# Patient Record
Sex: MALE | Race: WHITE | NOT HISPANIC OR LATINO | URBAN - METROPOLITAN AREA
[De-identification: names, ages, dates, MRNs, and addresses within clinical notes are randomized per-mention and may not be internally consistent; named-entity substitution may affect disease eponyms.]

---

## 2022-01-28 LAB
LEFT EYE DIABETIC RETINOPATHY: NORMAL
RIGHT EYE DIABETIC RETINOPATHY: NORMAL

## 2023-02-03 ENCOUNTER — OFFICE VISIT (OUTPATIENT)
Dept: INTERNAL MEDICINE CLINIC | Facility: CLINIC | Age: 53
End: 2023-02-03

## 2023-02-03 VITALS
OXYGEN SATURATION: 97 % | TEMPERATURE: 97.7 F | SYSTOLIC BLOOD PRESSURE: 174 MMHG | DIASTOLIC BLOOD PRESSURE: 91 MMHG | WEIGHT: 289.8 LBS | RESPIRATION RATE: 18 BRPM | HEART RATE: 96 BPM

## 2023-02-03 DIAGNOSIS — R03.0 ELEVATED BP WITHOUT DIAGNOSIS OF HYPERTENSION: ICD-10-CM

## 2023-02-03 DIAGNOSIS — E11.42 TYPE 2 DIABETES MELLITUS WITH DIABETIC POLYNEUROPATHY, WITHOUT LONG-TERM CURRENT USE OF INSULIN (HCC): ICD-10-CM

## 2023-02-03 DIAGNOSIS — Z11.59 NEED FOR HEPATITIS C SCREENING TEST: ICD-10-CM

## 2023-02-03 DIAGNOSIS — K42.9 UMBILICAL HERNIA WITHOUT OBSTRUCTION AND WITHOUT GANGRENE: ICD-10-CM

## 2023-02-03 DIAGNOSIS — Z00.00 ANNUAL PHYSICAL EXAM: Primary | ICD-10-CM

## 2023-02-03 RX ORDER — BLOOD SUGAR DIAGNOSTIC
STRIP MISCELLANEOUS
Qty: 100 STRIP | Refills: 3 | Status: SHIPPED | OUTPATIENT
Start: 2023-02-03

## 2023-02-03 RX ORDER — LANCETS
EACH MISCELLANEOUS
Qty: 100 EACH | Refills: 3 | Status: SHIPPED | OUTPATIENT
Start: 2023-02-03

## 2023-02-03 RX ORDER — BLOOD-GLUCOSE METER
EACH MISCELLANEOUS DAILY
Qty: 1 KIT | Refills: 0 | Status: SHIPPED | OUTPATIENT
Start: 2023-02-03

## 2023-02-03 NOTE — PATIENT INSTRUCTIONS

## 2023-02-03 NOTE — PROGRESS NOTES
ADULT ANNUAL [address]     NAME: Deb Edwards  AGE: 46 y o  SEX: male  : 1970     DATE: 2/3/2023     Assessment and Plan:     Problem List Items Addressed This Visit    None  Visit Diagnoses     Annual physical exam    -  Primary    Relevant Orders    Lipid Panel with Direct LDL reflex (Completed)    Comprehensive metabolic panel (Completed)    CBC and Platelet (Completed)    Type 2 diabetes mellitus with diabetic polyneuropathy, without long-term current use of insulin (HCC)    - not currently on therapy  Will check a1c to determine if insulin will be needed  Relevant Medications    Lancets (onetouch ultrasoft) lancets    glucose blood (OneTouch Ultra) test strip    Blood Glucose Monitoring Suppl (ONE TOUCH ULTRA 2) w/Device KIT    Other Relevant Orders    HEMOGLOBIN A1C W/ EAG ESTIMATION (Completed)    Microalbumin / creatinine urine ratio (Completed)    Umbilical hernia without obstruction and without gangrene    - chronic  Small  Reducible and non tender  Pt will hold offon surgical consult for now  Need for hepatitis C screening test        Relevant Orders    Hepatitis C Antibody ([facility]) (Completed)    Elevated BP without diagnosis of hypertension  - no prior hx of antihypertenisv use  Will star work up with studies ordered  Pt to keep ambulatory Bps and will f/u in a week  Relevant Orders    Urinalysis with microscopic (Completed)          Immunizations and preventive care screenings were discussed with patient today  Appropriate education was printed on patient's after visit summary  Counseling:  Dental Health: discussed importance of regular tooth brushing, flossing, and dental visits  · Exercise: the importance of regular exercise/physical activity was discussed  Recommend exercise 3-5 times per week for at least 30 minutes            Return in about 1 week (around 2/10/2023) for Recheck  Chief Complaint:     Chief Complaint   Patient presents with   • Establish Care     Pt states that he is here to start care need a physical would like to get his diabetes under control and blood work done      History of Present Illness:     Adult Annual Physical   Patient here for a comprehensive physical exam    Reports hx of dm2  Not currenlty taking therapy  Was on isnuling and metformin years ago  Family hx includes DM2  Family hx of colon cancer of PGF  Works for [employer] and [employer]  · Diet/Nutrition: well balanced diet  · Exercise: no formal exercise  Depression Screening  PHQ-2/9 Depression Screening    Little interest or pleasure in doing things: 0 - not at all  Feeling down, depressed, or hopeless: 0 - not at all  PHQ-2 Score: 0  PHQ-2 Interpretation: Negative depression screen       General Health  · Sleep: gets 4-6 hours of sleep on average  · Hearing: normal - bilateral   · Vision: goes for regular eye exams and wears glasses  · Dental: no dental visits for >1 year   Health  · Symptoms include: none   · Never had colon cancer screening       Review of Systems:     Review of Systems   Respiratory: Negative for cough and shortness of breath  Cardiovascular: Negative for chest pain and leg swelling  Gastrointestinal: Negative for abdominal pain, constipation and diarrhea  Musculoskeletal: Positive for arthralgias (b/l knee )  Neurological: Positive for numbness (b/l feet )  Negative for headaches        Past Medical History:     Past Medical History:   Diagnosis Date   • [facility] ([facility] )       Past Surgical History:     Past Surgical History:   Procedure Laterality Date   • KNEE SURGERY Left 1995   • SURGERY SCROTAL / TESTICULAR Right 2011    maybe 10 years ago      Family History:     Family History   Problem Relation Age of Onset   • Diabetes Mother    • Diabetes Father       Social History:     Social History     Socioeconomic History   • Marital status: /Civil Union     Spouse name: None   • Number of children: None   • Years of education: None   • Highest education level: None   Occupational History   • None   Tobacco Use   • Smoking status: Never   • Smokeless tobacco: Never   Vaping Use   • Vaping Use: Never used   Substance and Sexual Activity   • Alcohol use: Not Currently     Alcohol/week: 6 0 standard drinks     Types: 6 Cans of beer per week     Comment: social maybe a 6 pack in one month   • Drug use: Never   • Sexual activity: None   Other Topics Concern   • None   Social History Narrative   • None     Social Determinants of Health     Financial Resource Strain: Not on file   Food Insecurity: Not on file   Transportation Needs: Not on file   Physical Activity: Not on file   Stress: Not on file   Social Connections: Not on file   Intimate Partner Violence: Not on file   Housing Stability: Not on file      Current Medications:     Current Outpatient Medications   Medication Sig Dispense Refill   • Blood Glucose Monitoring Suppl (ONE TOUCH ULTRA 2) w/Device KIT Use in the morning 1 kit 0   • glucose blood (OneTouch Ultra) test strip Use to check blood sugar every morning 100 strip 3   • Lancets (onetouch ultrasoft) lancets Use to check blood sugar every morning 100 each 3   • atorvastatin (LIPITOR) 10 mg tablet Take 1 tablet (10 mg total) by mouth daily 30 tablet 2   • metFORMIN (GLUCOPHAGE) 1000 MG tablet Take 1 tablet (1,000 mg total) by mouth daily with breakfast 30 tablet 0     No current facility-administered medications for this visit  Allergies:     No Known Allergies   Physical Exam:     BP (!) 174/91 (BP Location: Left arm, Patient Position: Sitting, Cuff Size: Large) Comment: micah and machine  Pulse 96   Temp 97 7 °F (36 5 °C) (Temporal)   Resp 18   Wt 131 kg (289 lb 12 8 oz)   SpO2 97%     Physical Exam  Vitals and nursing note reviewed  Constitutional:       General: He is not in acute distress  Appearance: He is well-developed  HENT:      Head: Normocephalic and atraumatic  Right Ear: External ear normal       Left Ear: External ear normal    Eyes:      Conjunctiva/sclera: Conjunctivae normal       Pupils: Pupils are equal, round, and reactive to light  Cardiovascular:      Rate and Rhythm: Normal rate and regular rhythm  Heart sounds: No murmur heard  Pulmonary:      Effort: Pulmonary effort is normal  No respiratory distress  Breath sounds: Normal breath sounds  Abdominal:      Palpations: Abdomen is soft  Tenderness: There is no abdominal tenderness  Hernia: A hernia (umblical; reducible; non tender ) is present  Musculoskeletal:         General: No swelling  Skin:     General: Skin is warm and dry  Capillary Refill: Capillary refill takes less than 2 seconds  Neurological:      Mental Status: He is alert and oriented to person, place, and time     Psychiatric:         Mood and Affect: Mood normal           Cuate Jorgensen DO  [facility]

## 2023-02-07 ENCOUNTER — APPOINTMENT (OUTPATIENT)
Dept: LAB | Facility: CLINIC | Age: 53
End: 2023-02-07

## 2023-02-07 DIAGNOSIS — Z00.00 ANNUAL PHYSICAL EXAM: ICD-10-CM

## 2023-02-07 DIAGNOSIS — E11.42 TYPE 2 DIABETES MELLITUS WITH DIABETIC POLYNEUROPATHY, WITHOUT LONG-TERM CURRENT USE OF INSULIN (HCC): ICD-10-CM

## 2023-02-07 DIAGNOSIS — Z11.59 NEED FOR HEPATITIS C SCREENING TEST: ICD-10-CM

## 2023-02-07 LAB
ALBUMIN SERPL BCP-MCNC: 3.4 G/DL (ref 3.5–5)
ALP SERPL-CCNC: 97 U/L (ref 46–116)
ALT SERPL W P-5'-P-CCNC: 24 U/L (ref 12–78)
ANION GAP SERPL CALCULATED.3IONS-SCNC: 7 MMOL/L (ref 4–13)
AST SERPL W P-5'-P-CCNC: 21 U/L (ref 5–45)
BACTERIA UR QL AUTO: ABNORMAL /HPF
BILIRUB SERPL-MCNC: 0.48 MG/DL (ref 0.2–1)
BILIRUB UR QL STRIP: NEGATIVE
BUN SERPL-MCNC: 16 MG/DL (ref 5–25)
CALCIUM ALBUM COR SERPL-MCNC: 9.8 MG/DL (ref 8.3–10.1)
CALCIUM SERPL-MCNC: 9.3 MG/DL (ref 8.3–10.1)
CHLORIDE SERPL-SCNC: 104 MMOL/L (ref 96–108)
CHOLEST SERPL-MCNC: 215 MG/DL
CLARITY UR: CLEAR
CO2 SERPL-SCNC: 24 MMOL/L (ref 21–32)
COLOR UR: ABNORMAL
CREAT SERPL-MCNC: 0.72 MG/DL (ref 0.6–1.3)
CREAT UR-MCNC: 73.8 MG/DL
ERYTHROCYTE [DISTWIDTH] IN BLOOD BY AUTOMATED COUNT: 13 % (ref 11.6–15.1)
GFR SERPL CREATININE-BSD FRML MDRD: 107 ML/MIN/1.73SQ M
GLUCOSE P FAST SERPL-MCNC: 287 MG/DL (ref 65–99)
GLUCOSE UR STRIP-MCNC: ABNORMAL MG/DL
HCT VFR BLD AUTO: 43.1 % (ref 36.5–49.3)
HDLC SERPL-MCNC: 57 MG/DL
HGB BLD-MCNC: 14.3 G/DL (ref 12–17)
HGB UR QL STRIP.AUTO: ABNORMAL
HYALINE CASTS #/AREA URNS LPF: ABNORMAL /LPF
KETONES UR STRIP-MCNC: NEGATIVE MG/DL
LDLC SERPL CALC-MCNC: 138 MG/DL (ref 0–100)
LEUKOCYTE ESTERASE UR QL STRIP: ABNORMAL
MCH RBC QN AUTO: 28.9 PG (ref 26.8–34.3)
MCHC RBC AUTO-ENTMCNC: 33.2 G/DL (ref 31.4–37.4)
MCV RBC AUTO: 87 FL (ref 82–98)
MICROALBUMIN UR-MCNC: 1890 MG/L (ref 0–20)
MICROALBUMIN/CREAT 24H UR: 2561 MG/G CREATININE (ref 0–30)
MUCOUS THREADS UR QL AUTO: ABNORMAL
NITRITE UR QL STRIP: NEGATIVE
NON-SQ EPI CELLS URNS QL MICRO: ABNORMAL /HPF
OTHER STN SPEC: ABNORMAL
PH UR STRIP.AUTO: 6 [PH]
PLATELET # BLD AUTO: 211 THOUSANDS/UL (ref 149–390)
PMV BLD AUTO: 12.7 FL (ref 8.9–12.7)
POTASSIUM SERPL-SCNC: 4.1 MMOL/L (ref 3.5–5.3)
PROT SERPL-MCNC: 7.3 G/DL (ref 6.4–8.4)
PROT UR STRIP-MCNC: ABNORMAL MG/DL
RBC # BLD AUTO: 4.95 MILLION/UL (ref 3.88–5.62)
RBC #/AREA URNS AUTO: ABNORMAL /HPF
SODIUM SERPL-SCNC: 135 MMOL/L (ref 135–147)
SP GR UR STRIP.AUTO: 1.03 (ref 1–1.03)
TRIGL SERPL-MCNC: 98 MG/DL
UROBILINOGEN UR STRIP-ACNC: <2 MG/DL
WBC # BLD AUTO: 7.63 THOUSAND/UL (ref 4.31–10.16)
WBC #/AREA URNS AUTO: ABNORMAL /HPF

## 2023-02-08 ENCOUNTER — TELEPHONE (OUTPATIENT)
Dept: INTERNAL MEDICINE CLINIC | Facility: CLINIC | Age: 53
End: 2023-02-08

## 2023-02-08 LAB
EST. AVERAGE GLUCOSE BLD GHB EST-MCNC: 237 MG/DL
HBA1C MFR BLD: 9.9 %
HCV AB SER QL: NORMAL

## 2023-02-08 NOTE — TELEPHONE ENCOUNTER
----- Message from Timbo Johnson DO sent at 2/8/2023  9:58 AM EST -----  Please schedule pt for office visit to discuss labs

## 2023-02-09 ENCOUNTER — TELEPHONE (OUTPATIENT)
Dept: INTERNAL MEDICINE CLINIC | Facility: CLINIC | Age: 53
End: 2023-02-09

## 2023-02-09 DIAGNOSIS — E78.2 MIXED HYPERLIPIDEMIA: Primary | ICD-10-CM

## 2023-02-09 DIAGNOSIS — E11.65 TYPE 2 DIABETES MELLITUS WITH HYPERGLYCEMIA, WITHOUT LONG-TERM CURRENT USE OF INSULIN (HCC): Primary | ICD-10-CM

## 2023-02-09 RX ORDER — ATORVASTATIN CALCIUM 10 MG/1
10 TABLET, FILM COATED ORAL DAILY
Qty: 30 TABLET | Refills: 2 | Status: SHIPPED | OUTPATIENT
Start: 2023-02-09 | End: 2023-03-11

## 2023-02-09 NOTE — TELEPHONE ENCOUNTER
----- Message from Kiet Jane DO sent at 2/9/2023  2:53 PM EST -----  A1c is high at 9 9  I have sent metformin 1000mg once a day to his pharmacy  He is to follow up in th office in 4 weeks and I will likely increase the dose and add a second medication at that time  His cholesterol is also high I would like him to start a cholesterol medication  I will send that to pharmacy as well

## 2023-02-15 ENCOUNTER — OFFICE VISIT (OUTPATIENT)
Dept: INTERNAL MEDICINE CLINIC | Facility: CLINIC | Age: 53
End: 2023-02-15

## 2023-02-15 VITALS
WEIGHT: 258.2 LBS | RESPIRATION RATE: 18 BRPM | TEMPERATURE: 97.5 F | SYSTOLIC BLOOD PRESSURE: 183 MMHG | DIASTOLIC BLOOD PRESSURE: 92 MMHG | OXYGEN SATURATION: 98 % | HEART RATE: 87 BPM

## 2023-02-15 DIAGNOSIS — I10 PRIMARY HYPERTENSION: ICD-10-CM

## 2023-02-15 DIAGNOSIS — E11.42 TYPE 2 DIABETES MELLITUS WITH DIABETIC POLYNEUROPATHY, WITHOUT LONG-TERM CURRENT USE OF INSULIN (HCC): Primary | ICD-10-CM

## 2023-02-15 PROBLEM — K42.9 UMBILICAL HERNIA WITHOUT OBSTRUCTION AND WITHOUT GANGRENE: Status: ACTIVE | Noted: 2023-02-15

## 2023-02-15 RX ORDER — DULAGLUTIDE 0.75 MG/.5ML
0.75 INJECTION, SOLUTION SUBCUTANEOUS
Qty: 2 ML | Refills: 0 | Status: SHIPPED | OUTPATIENT
Start: 2023-02-15 | End: 2023-08-14

## 2023-02-15 RX ORDER — LISINOPRIL 10 MG/1
10 TABLET ORAL DAILY
Qty: 30 TABLET | Refills: 0 | Status: SHIPPED | OUTPATIENT
Start: 2023-02-15 | End: 2023-03-17

## 2023-02-15 NOTE — PROGRESS NOTES
Name: Rossi Pimentel      : 1970      MRN: [id number]  Encounter Provider: Kj Torres DO  Encounter Date: 2/15/2023   [facility]   [address]     1  Type 2 diabetes mellitus with diabetic polyneuropathy, without long-term current use of insulin (HCC)-a m  blood sugars are improving  Now on 200s on metformin 1000mg twice daily  Will add GLP-1 agonist   Patient to continue checking a m  glucose log  Foot exam completed  Patient  follows with podiatry  -     dulaglutide (Trulicity) 1 84 RT/8 1UW injection; Inject 0 5 mL (0 75 mg total) under the skin every 7 days  -     lisinopril (ZESTRIL) 10 mg tablet; Take 1 tablet (10 mg total) by mouth daily    2  Primary hypertension-persistent elevation  No secondary causes noted on laboratory studies will start ACE therapy  Patient will return in 1 week for BP check  Will evaluate response to his therapy with metabolic panel   -     lisinopril (ZESTRIL) 10 mg tablet; Take 1 tablet (10 mg total) by mouth daily  -     Basic metabolic panel; Future           Subjective      Discussed metformin use  Tolerating it well  AM blood sugars in the 200s  Discussed blood pressures  Review of Systems   Eyes: Negative for visual disturbance  Respiratory: Negative for shortness of breath  Cardiovascular: Negative for chest pain and leg swelling  Gastrointestinal: Negative for abdominal pain, diarrhea and nausea  Neurological: Negative for headaches         Current Outpatient Medications on File Prior to Visit   Medication Sig   • atorvastatin (LIPITOR) 10 mg tablet Take 1 tablet (10 mg total) by mouth daily   • Blood Glucose Monitoring Suppl (ONE TOUCH ULTRA 2) w/Device KIT Use in the morning   • glucose blood (OneTouch Ultra) test strip Use to check blood sugar every morning   • Lancets (onetouch ultrasoft) lancets Use to check blood sugar every morning   • metFORMIN (GLUCOPHAGE) 1000 MG tablet Take 1 tablet (1,000 mg total) by mouth daily with breakfast       Objective     BP (!) 183/92 (BP Location: Left arm, Patient Position: Standing, Cuff Size: Standard) Comment: ollie and machine  Pulse 87   Temp 97 5 °F (36 4 °C) (Temporal)   Resp 18   Wt 117 kg (258 lb 3 2 oz)   SpO2 98%     Physical Exam  HENT:      Head: Normocephalic and atraumatic  Eyes:      Conjunctiva/sclera: Conjunctivae normal    Cardiovascular:      Rate and Rhythm: Normal rate and regular rhythm  Pulses: no weak pulses          Posterior tibial pulses are 2+ on the right side and 2+ on the left side  Pulmonary:      Effort: Pulmonary effort is normal       Breath sounds: Normal breath sounds  Musculoskeletal:      Right lower leg: No edema  Left lower leg: No edema  Feet:      Right foot:      Skin integrity: Callus and dry skin present  No erythema  Left foot:      Skin integrity: Callus and dry skin present  No erythema  Neurological:      Mental Status: He is alert and oriented to person, place, and time  Patient's shoes and socks removed  Right Foot/Ankle   Right Foot Inspection  Skin Exam: skin intact, dry skin, callus and callus  No erythema  Toe Exam: No tenderness    Sensory   Proprioception: intact  Monofilament testing: intact    Vascular  Capillary refills: < 3 seconds  The right PT pulse is 2+  Left Foot/Ankle  Left Foot Inspection  Skin Exam: skin intact, dry skin and callus  No erythema  Toe Exam: No tenderness and no left toe deformity  Sensory   Proprioception: intact  Monofilament testing: intact    Vascular  Capillary refills: < 3 seconds  The left PT pulse is 2+       Assign Risk Category  No deformity present  No loss of protective sensation  No weak pulses  Risk: [address], DO

## 2023-02-15 NOTE — PATIENT INSTRUCTIONS
Start the new blood pressure medication call lisinopril     Start the new blood sugar medication called trulicity once a week   Continue the metformin

## 2023-02-16 ENCOUNTER — TELEPHONE (OUTPATIENT)
Dept: ADMINISTRATIVE | Facility: OTHER | Age: 53
End: 2023-02-16

## 2023-02-16 NOTE — TELEPHONE ENCOUNTER
----- [facility] sent at 2/15/2023 10:50 AM EST -----  02/15/23 10:50 AM    Hello, our patient Huntley Goodpasture has had eye exam [place] [facility] at [facility] completed/performed   Please assist in updating the patient chart by [[email or web address] The date of service maybe 6 months ago 2022    Thank you,  [facility] [address]

## 2023-02-16 NOTE — LETTER
Diabetic Eye Exam Form    Date Requested: 23  Patient: Oliver Judd  Patient : 1970   Referring Provider: Jenny Cochran DO      DIABETIC Eye Exam Date _______________________________      Type of Exam MUST be documented for Diabetic Eye Exams  Please CHECK ONE  Retinal Exam       Dilated Retinal Exam       OCT       Optomap-Iris Exam      Fundus Photography       Left Eye - Please check Retinopathy or No Retinopathy        Exam did show retinopathy    Exam did not show retinopathy       Right Eye - Please check Retinopathy or No Retinopathy       Exam did show retinopathy    Exam did not show retinopathy       Comments __________________________________________________________    Practice Providing Exam ______________________________________________    Exam Performed By (print name) _______________________________________      Provider Signature ___________________________________________________      These reports are needed for  compliance  Please fax this completed form and a copy of the Diabetic Eye Exam report to our office located at [place] [address] as soon as possible via Fax [phone] beth Jerrye: Phone [phone]  We thank you for your assistance in treating our mutual patient

## 2023-02-20 NOTE — TELEPHONE ENCOUNTER
Upon review of the In Basket request and the patient's chart, initial outreach has been made via fax to facility  Please see Contacts section for details       Thank you  Marylene Pain, MA

## 2023-02-22 NOTE — TELEPHONE ENCOUNTER
Upon review of the In Basket request we were able to locate, review, and update the patient chart as requested for Diabetic Eye Exam     Any additional questions or concerns should be emailed to the Practice Liaisons via the appropriate education email address, please do not reply via In Basket      Thank you  Cole Mc MA

## 2023-03-05 DIAGNOSIS — E11.65 TYPE 2 DIABETES MELLITUS WITH HYPERGLYCEMIA, WITHOUT LONG-TERM CURRENT USE OF INSULIN (HCC): ICD-10-CM

## 2023-03-05 DIAGNOSIS — E78.2 MIXED HYPERLIPIDEMIA: ICD-10-CM

## 2023-03-05 RX ORDER — ATORVASTATIN CALCIUM 10 MG/1
TABLET, FILM COATED ORAL
Qty: 90 TABLET | Refills: 1 | Status: SHIPPED | OUTPATIENT
Start: 2023-03-05

## 2023-03-10 DIAGNOSIS — I10 PRIMARY HYPERTENSION: ICD-10-CM

## 2023-03-10 DIAGNOSIS — E11.42 TYPE 2 DIABETES MELLITUS WITH DIABETIC POLYNEUROPATHY, WITHOUT LONG-TERM CURRENT USE OF INSULIN (HCC): ICD-10-CM

## 2023-03-10 RX ORDER — LISINOPRIL 10 MG/1
TABLET ORAL
Qty: 90 TABLET | Refills: 1 | Status: SHIPPED | OUTPATIENT
Start: 2023-03-10 | End: 2023-03-15 | Stop reason: SDUPTHER

## 2023-03-13 ENCOUNTER — APPOINTMENT (OUTPATIENT)
Dept: LAB | Facility: CLINIC | Age: 53
End: 2023-03-13

## 2023-03-13 DIAGNOSIS — I10 PRIMARY HYPERTENSION: ICD-10-CM

## 2023-03-13 LAB
ANION GAP SERPL CALCULATED.3IONS-SCNC: 4 MMOL/L (ref 4–13)
BUN SERPL-MCNC: 19 MG/DL (ref 5–25)
CALCIUM SERPL-MCNC: 9.6 MG/DL (ref 8.3–10.1)
CHLORIDE SERPL-SCNC: 108 MMOL/L (ref 96–108)
CO2 SERPL-SCNC: 25 MMOL/L (ref 21–32)
CREAT SERPL-MCNC: 0.81 MG/DL (ref 0.6–1.3)
GFR SERPL CREATININE-BSD FRML MDRD: 102 ML/MIN/1.73SQ M
GLUCOSE P FAST SERPL-MCNC: 135 MG/DL (ref 65–99)
POTASSIUM SERPL-SCNC: 4.2 MMOL/L (ref 3.5–5.3)
SODIUM SERPL-SCNC: 137 MMOL/L (ref 135–147)

## 2023-03-15 ENCOUNTER — OFFICE VISIT (OUTPATIENT)
Dept: INTERNAL MEDICINE CLINIC | Facility: CLINIC | Age: 53
End: 2023-03-15

## 2023-03-15 VITALS
DIASTOLIC BLOOD PRESSURE: 90 MMHG | OXYGEN SATURATION: 98 % | WEIGHT: 280.2 LBS | TEMPERATURE: 97.5 F | SYSTOLIC BLOOD PRESSURE: 158 MMHG | HEART RATE: 82 BPM | RESPIRATION RATE: 18 BRPM

## 2023-03-15 DIAGNOSIS — E11.42 TYPE 2 DIABETES MELLITUS WITH DIABETIC POLYNEUROPATHY, WITHOUT LONG-TERM CURRENT USE OF INSULIN (HCC): ICD-10-CM

## 2023-03-15 DIAGNOSIS — M25.562 CHRONIC PAIN OF LEFT KNEE: ICD-10-CM

## 2023-03-15 DIAGNOSIS — G89.29 CHRONIC PAIN OF LEFT KNEE: ICD-10-CM

## 2023-03-15 DIAGNOSIS — I10 PRIMARY HYPERTENSION: Primary | ICD-10-CM

## 2023-03-15 RX ORDER — LISINOPRIL 20 MG/1
20 TABLET ORAL DAILY
Qty: 90 TABLET | Refills: 0 | Status: SHIPPED | OUTPATIENT
Start: 2023-03-15 | End: 2023-06-13

## 2023-03-15 NOTE — PROGRESS NOTES
Name: Tomas Perea      : 1970      MRN: [id number]  Encounter Provider: Lamin Pugh DO  Encounter Date: 3/15/2023   Enc[facility]: [facility]   [address]     1  Primary hypertension-improved with lisinopril 10mg qd  Not quite at goal  Will increase to 20mg qd  -     lisinopril (ZESTRIL) 20 mg tablet; Take 1 tablet (20 mg total) by mouth daily    2  Type 2 diabetes mellitus with diabetic polyneuropathy, without long-term current use of insulin ([address] )- BG improving  Will increase glp-1 agonist  Continue metformin 1000mg bid  If nausea persist with weekly injection will consider converting to oral GLP1    -     lisinopril (ZESTRIL) 20 mg tablet; Take 1 tablet (20 mg total) by mouth daily  -     dulaglutide (Trulicity) 1 5 SL/5 1WL injection; Inject 0 5 mL (1 5 mg total) under the skin every 7 days  -     HEMOGLOBIN A1C W/ EAG ESTIMATION; Future    3  Chronic pain of left knee- known OA  C/o stiffness and weakness especially after prolonged sitting  Follows with orthopedics  Will have pt start PT and f/u with orthopedics if no improvement  -     Ambulatory Referral to Physical Therapy; Future           Subjective        Last 5 BP BP Readings from Last 5 Encounters:  03/15/23 : 158/90  02/15/23 : (!) 183/92  23 : (!) 174/91    Current Meds Lisinopril 10mg qd  Checking BG  AM fasting 130s-120s  Taking truclity weekly  On the day of the injection has some nausea and abdominal discomfort for the day then it resovles by the next day  Review of Systems   Constitutional: Negative for fever  Respiratory: Negative for shortness of breath  Cardiovascular: Negative for chest pain and leg swelling  Gastrointestinal: Positive for nausea  Negative for vomiting  Musculoskeletal: Positive for arthralgias         Current Outpatient Medications on File Prior to Visit   Medication Sig   • atorvastatin (LIPITOR) 10 mg tablet TAKE 1 TABLET BY MOUTH EVERY DAY   • Blood Glucose Monitoring Suppl (ONE TOUCH ULTRA 2) w/Device KIT Use in the morning   • glucose blood (OneTouch Ultra) test strip Use to check blood sugar every morning   • Lancets (onetouch ultrasoft) lancets Use to check blood sugar every morning   • metFORMIN (GLUCOPHAGE) 1000 MG tablet TAKE 1 TABLET BY MOUTH EVERY DAY WITH BREAKFAST   • [DISCONTINUED] dulaglutide (Trulicity) 0 10 QL/7 6WT injection Inject 0 5 mL (0 75 mg total) under the skin every 7 days   • [DISCONTINUED] lisinopril (ZESTRIL) 10 mg tablet TAKE 1 TABLET BY MOUTH EVERY DAY       Objective     /90 (BP Location: Left arm, Patient Position: Sitting, Cuff Size: Standard)   Pulse 82   Temp 97 5 °F (36 4 °C) (Temporal)   Resp 18   Wt 127 kg (280 lb 3 2 oz)   SpO2 98%     Physical Exam  HENT:      Head: Normocephalic and atraumatic  Eyes:      Conjunctiva/sclera: Conjunctivae normal    Cardiovascular:      Rate and Rhythm: Normal rate and regular rhythm  Heart sounds: No murmur heard  Pulmonary:      Effort: Pulmonary effort is normal       Breath sounds: Normal breath sounds  Abdominal:      General: Bowel sounds are normal       Palpations: Abdomen is soft  Tenderness: There is no abdominal tenderness  Hernia: A hernia is present  Musculoskeletal:      Right lower leg: No edema  Left lower leg: No edema  Neurological:      Mental Status: He is oriented to person, place, and time        Gait: Gait normal    Psychiatric:         Mood and Affect: Mood normal          Behavior: Behavior normal        Josephine Daily DO

## 2023-03-17 ENCOUNTER — TELEPHONE (OUTPATIENT)
Dept: INTERNAL MEDICINE CLINIC | Facility: CLINIC | Age: 53
End: 2023-03-17

## 2023-03-17 NOTE — TELEPHONE ENCOUNTER
----- Message from Sendy Contreras DO sent at 3/17/2023  1:13 PM EDT -----  BMP is ok   Blood sugars have improved some

## 2023-05-10 ENCOUNTER — OFFICE VISIT (OUTPATIENT)
Age: 53
End: 2023-05-10

## 2023-05-10 ENCOUNTER — APPOINTMENT (OUTPATIENT)
Age: 53
End: 2023-05-10

## 2023-05-10 VITALS
RESPIRATION RATE: 18 BRPM | HEART RATE: 91 BPM | DIASTOLIC BLOOD PRESSURE: 102 MMHG | SYSTOLIC BLOOD PRESSURE: 148 MMHG | WEIGHT: 277.6 LBS | OXYGEN SATURATION: 98 % | TEMPERATURE: 97.7 F

## 2023-05-10 DIAGNOSIS — Z12.11 COLON CANCER SCREENING: ICD-10-CM

## 2023-05-10 DIAGNOSIS — E11.42 TYPE 2 DIABETES MELLITUS WITH DIABETIC POLYNEUROPATHY, WITHOUT LONG-TERM CURRENT USE OF INSULIN (HCC): ICD-10-CM

## 2023-05-10 DIAGNOSIS — I10 PRIMARY HYPERTENSION: ICD-10-CM

## 2023-05-10 DIAGNOSIS — E11.42 TYPE 2 DIABETES MELLITUS WITH DIABETIC POLYNEUROPATHY, WITHOUT LONG-TERM CURRENT USE OF INSULIN (HCC): Primary | ICD-10-CM

## 2023-05-10 LAB
EST. AVERAGE GLUCOSE BLD GHB EST-MCNC: 143 MG/DL
HBA1C MFR BLD: 6.6 %
LEFT EYE DIABETIC RETINOPATHY: ABNORMAL
LEFT EYE IMAGE QUALITY: ABNORMAL
LEFT EYE MACULAR EDEMA: ABNORMAL
LEFT EYE OTHER RETINOPATHY: ABNORMAL
RIGHT EYE DIABETIC RETINOPATHY: ABNORMAL
RIGHT EYE IMAGE QUALITY: ABNORMAL
RIGHT EYE MACULAR EDEMA: ABNORMAL
RIGHT EYE OTHER RETINOPATHY: ABNORMAL
SEVERITY (EYE EXAM): ABNORMAL

## 2023-05-10 RX ORDER — LISINOPRIL 40 MG/1
40 TABLET ORAL DAILY
Qty: 90 TABLET | Refills: 0 | Status: SHIPPED | OUTPATIENT
Start: 2023-05-10 | End: 2023-08-08

## 2023-05-10 NOTE — PROGRESS NOTES
Name: Holland Emmanuel      : 1970      MRN: [id number]  Encounter Provider: Gaby Basurto DO  Encounter Date: 5/10/2023   Encounter department: [address] [place] [address]  Type 2 diabetes mellitus with diabetic polyneuropathy, without long-term current use of insulin ([facility] )- blood sugars improving on trulicity 5 3HT weekly and metformin 2000mg qd  Pt to continue  Neuropathy of b/l feet has improved but has not remitted  Discussed potential gabapentin use  Pt would like to wait for now and see if with continue blood sugar improvement if the paraesthesias remit  a1c pending    -     IRIS Diabetic eye exam  2  Primary hypertension- not at goal  Will increase ace to 40mg qd  Pt to f/u next week for BP check  If no improvement will consider HCTZ vs CBB  -     lisinopril (ZESTRIL) 40 mg tablet; Take 1 tablet (40 mg total) by mouth daily    3  Colon cancer screening  -     Ambulatory referral for colonoscopy; Future           Subjective      Presents for htn and dm2 f/u  Last visit ace increased and truclicity was increased to 1 5 qweekly  Pt reports BG in 150s or less  Before bed he can see number in lower 100s  BP at home 140s   Hypertension / Hyperlipidemia       Last 5 BP BP Readings from Last 5 Encounters:  05/10/23 : (!) 148/102  03/15/23 : 158/90  02/15/23 : (!) 183/92  23 : (!) 174/91    Current Meds lisinopril 20mg qd  Review of Systems   Constitutional: Negative for fever  Respiratory: Negative for shortness of breath  Cardiovascular: Negative for chest pain  Gastrointestinal: Positive for nausea (associated with GLP agonist )  Neurological: Negative for headaches         Current Outpatient Medications on File Prior to Visit   Medication Sig   • atorvastatin (LIPITOR) 10 mg tablet TAKE 1 TABLET BY MOUTH EVERY DAY   • Blood Glucose Monitoring Suppl (ONE TOUCH ULTRA 2) w/Device KIT Use in the morning   • dulaglutide (Trulicity) 1 5 QW/0 5VQ injection Inject 0 5 mL (1 5 mg total) under the skin every 7 days   • glucose blood (OneTouch Ultra) test strip Use to check blood sugar every morning   • Lancets (onetouch ultrasoft) lancets Use to check blood sugar every morning   • metFORMIN (GLUCOPHAGE) 1000 MG tablet TAKE 1 TABLET BY MOUTH EVERY DAY WITH BREAKFAST   • [DISCONTINUED] lisinopril (ZESTRIL) 20 mg tablet Take 1 tablet (20 mg total) by mouth daily       Objective     BP (!) 148/102 (BP Location: Left arm, Patient Position: Sitting, Cuff Size: Standard)   Pulse 91   Temp 97 7 °F (36 5 °C) (Temporal)   Resp 18   Wt 126 kg (277 lb 9 6 oz)   SpO2 98%     Physical Exam  Cardiovascular:      Rate and Rhythm: Normal rate and regular rhythm  Pulmonary:      Effort: Pulmonary effort is normal       Breath sounds: Normal breath sounds  Abdominal:      General: Bowel sounds are increased  Tenderness: There is no abdominal tenderness  Hernia: A hernia is present  Neurological:      Mental Status: He is alert         Ely Jung, DO

## 2023-05-11 ENCOUNTER — TELEPHONE (OUTPATIENT)
Age: 53
End: 2023-05-11

## 2023-05-11 NOTE — TELEPHONE ENCOUNTER
----- Message from Betty Yung DO sent at 5/11/2023  8:47 AM EDT -----  Iris screen shows known diabetic related changes to the vessels in the eye continue to follow up with eye doctor

## 2023-05-11 NOTE — TELEPHONE ENCOUNTER
----- Message from Margi Guzman DO sent at 5/11/2023  8:46 AM EDT -----  A1c is 6 6  this is great  In the range it needs to be

## 2023-06-22 ENCOUNTER — OFFICE VISIT (OUTPATIENT)
Dept: GASTROENTEROLOGY | Facility: CLINIC | Age: 53
End: 2023-06-22
Payer: COMMERCIAL

## 2023-06-22 VITALS
BODY MASS INDEX: 39.65 KG/M2 | SYSTOLIC BLOOD PRESSURE: 162 MMHG | WEIGHT: 277 LBS | DIASTOLIC BLOOD PRESSURE: 100 MMHG | HEART RATE: 88 BPM | HEIGHT: 70 IN | OXYGEN SATURATION: 98 %

## 2023-06-22 DIAGNOSIS — K62.5 RECTAL BLEEDING: ICD-10-CM

## 2023-06-22 DIAGNOSIS — R19.4 CHANGE IN BOWEL HABITS: Primary | ICD-10-CM

## 2023-06-22 DIAGNOSIS — Z80.0 FAMILY HISTORY OF COLON CANCER: ICD-10-CM

## 2023-06-22 DIAGNOSIS — R15.2 FECAL URGENCY: ICD-10-CM

## 2023-06-22 DIAGNOSIS — K59.09 OTHER CONSTIPATION: ICD-10-CM

## 2023-06-22 DIAGNOSIS — Z12.11 COLON CANCER SCREENING: ICD-10-CM

## 2023-06-22 PROCEDURE — 99204 OFFICE O/P NEW MOD 45 MIN: CPT | Performed by: INTERNAL MEDICINE

## 2023-06-22 NOTE — PROGRESS NOTES
[facility]    Dear Dr Meghann Son,    I had the pleasure of seeing your patient Glo Pederson in the office today and I thank you for this kind referral        Chief Complaint: Change in bowel habits      HPI:  Glo Pederson is a 46 y o  male who presents with long history of diabetic gastroparesis which greatly improved as his glucose came under better control  He stopped going to the doctor regularly and his blood sugar got very out-of-control with hemoglobin A1c levels over 10 according to the patient  Since getting back on the program he has lost approximately 90 pounds  His hemoglobin A1c is back in the 6 range  The patient is no longer having any gastroparesis symptoms  However he had a distinct change in bowel habits during all of this  He was having some loose stool in the beginning but then constipation and a lot of abdominal gas  He occasionally has to push because of a sense of urgency  Occasionally if passing gas he will also pass stool  He does not have full incontinence of feces  He sees occasional bright red blood on the toilet paper  The patient has no other lower GI symptomatology  He has never had a colonoscopy  There is a family history of colon cancer in the paternal grandfather and a paternal aunt  He has no other GI symptoms at the present time  Review of Systems:   Constitutional: No fever or chills, feels well, no tiredness, weight loss as above  HENT: No complaints of earache, no hearing loss, no nosebleeds, no nasal discharge, no sore throat, no hoarseness  Eyes: No complaints of eye pain, no red eyes, no discharge from eyes, no itchy eyes  Cardiovascular: No complaints of slow heart rate, no fast heart rate, no chest pain, no palpitations, no leg claudication, no lower extremity edema  Respiratory: No complaints of shortness of breath, no wheezing, no cough, no SOB on exertion, no orthopnea     Gastrointestinal: As noted in "HPI  Genitourinary: No complaints of dysuria, no incontinence, no hesitancy, no nocturia  Musculoskeletal: Positive bilateral knee arthralgia, no myalgias, no joint swelling or stiffness, no limb pain or swelling  Neurological: No complaints of headache, no confusion, no convulsions, no numbness or tingling, no dizziness or fainting, no limb weakness, no difficulty walking  Skin: No complaints of skin rash or skin lesions, no itching, no skin wound, no dry skin  Hematological/Lymphatic: No complaints of swollen glands, does not bleed easy  Allergic/Immunologic: No immunocompromised state  Endocrine:  No complaints of polyuria, no polydipsia  Psychiatric/Behavioral: is not suicidal, no sleep disturbances, no anxiety or depression, no change in personality, no emotional problems  Historical Information   Past Medical History:   Diagnosis Date   • Diabetes mellitus ([facility] [address] )    • Hyperlipidemia    • Hypertension      Past Surgical History:   Procedure Laterality Date   • KNEE SURGERY Left 1995   • SURGERY SCROTAL / TESTICULAR Right 2011    maybe 10 years ago     Social History   Social History     Substance and Sexual Activity   Alcohol Use Not Currently   • Alcohol/week: 6 0 standard drinks of alcohol   • Types: 6 Cans of beer per week    Comment: social maybe a 6 pack in one month     Social History     Substance and Sexual Activity   Drug Use Never     Social History     Tobacco Use   Smoking Status Never   Smokeless Tobacco Never     Family History   Problem Relation Age of Onset   • Diabetes Mother    • Diabetes Father          Current Medications: has a current medication list which includes the following prescription(s): atorvastatin, one touch ultra 2, dulaglutide, onetouch ultra, onetouch ultrasoft, lisinopril, and metformin         Vital Signs: /100   Pulse 88   Ht 5' 10\" (1 778 m)   Wt 126 kg (277 lb)   SpO2 98%   BMI 39 75 kg/m²     Physical Exam:   Constitutional  General " Appearance: No acute distress, well appearing and well nourished  Head  Normocephalic  Eyes  Conjunctivae and lids: No swelling, erythema, or discharge  Pupils and irises: Equal, round and reactive to light  Ears, Nose, Mouth, and Throat  External inspection of ears and nose: Normal  Nasal mucosa, septum and turbinates: Normal without edema or erythema/   Oropharynx: Normal with no erythema, edema, exudate or lesions  Neck  Normal range of motion  Neck supple  Cardiovascular  Auscultation of the heart: Normal rate and rhythm, normal S1 and S2 without murmurs  Examination of the extremities for edema and/or varicosities: Normal  Pulmonary/Chest  Respiratory effort: No increased work of breathing or signs of respiratory distress  Auscultation of lungs: Clear to auscultation, equal breath sounds bilaterally, no wheezes, rales, no rhonchi  Abdomen  Abdomen: Non-tender, no masses  Positive ventral hernia, nontender and reducible  Liver and spleen: No hepatomegaly or splenomegaly  Musculoskeletal  Gait and station: normal   Digits and Nails: normal without clubbing or cyanosis  Inspection/palpation of joints, bones, and muscles: Normal  Neurological  No nystagmus or asterixis  Skin  Skin and subcutaneous tissue: Normal without rashes or lesions  Lymphatic  Palpation of the lymph nodes in neck: No lymphadenopathy     Psychiatric  Orientation to person, place and time: Normal   Mood and affect: Normal          Labs:   Lab Results   Component Value Date    ALT 24 02/07/2023    AST 21 02/07/2023    BUN 19 03/13/2023    CALCIUM 9 6 03/13/2023     03/13/2023    CO2 25 03/13/2023    CREATININE 0 81 03/13/2023    HDL 57 02/07/2023    HCT 43 1 02/07/2023    HGB 14 3 02/07/2023    HGBA1C 6 6 (H) 05/10/2023     02/07/2023    K 4 2 03/13/2023    TRIG 98 02/07/2023    WBC 7 63 02/07/2023         X-Rays & Procedures:   No orders to display ______________________________________________________________________      Assessment & Plan:      Diagnoses and all orders for this visit:    Change in bowel habits  -     Colonoscopy; Future    Colon cancer screening  -     Ambulatory referral for colonoscopy    Other constipation  -     Colonoscopy; Future    Fecal urgency  -     Colonoscopy; Future    Rectal bleeding  -     Colonoscopy; Future    Family history of colon cancer  -     Colonoscopy; Future        I have taken the liberty of scheduling the patient for colonoscopy  I will be happy to inform you of his results and further recommendations  I would like to thank you for allowing me to participate in his care              With warmest regards,    Luis Logan MD, [facility]

## 2023-06-22 NOTE — LETTER
June 22, 2023     Gurvinder Medina DO  [address] [facility] [address]    Patient: Nadine Rodriguez   YOB: 1970   Date of Visit: 6/22/2023       Dear Dr Denis Bello: Thank you for referring Nadine Rodriguez to me for evaluation  Below are my notes for this consultation  If you have questions, please do not hesitate to call me  I look forward to following your patient along with you  Sincerely,        Hal Branham MD        CC: No Recipients    Hal Branham MD  6/22/2023  3:46 PM  [facility]    Dear Dr Denis Bello,    I had the pleasure of seeing your patient Nadine Rodriguez in the office today and I thank you for this kind referral        Chief Complaint: Change in bowel habits      HPI:  Nadine Rodriguez is a 46 y o  male who presents with long history of diabetic gastroparesis which greatly improved as his glucose came under better control  He stopped going to the doctor regularly and his blood sugar got very out-of-control with hemoglobin A1c levels over 10 according to the patient  Since getting back on the program he has lost approximately 90 pounds  His hemoglobin A1c is back in the 6 range  The patient is no longer having any gastroparesis symptoms  However he had a distinct change in bowel habits during all of this  He was having some loose stool in the beginning but then constipation and a lot of abdominal gas  He occasionally has to push because of a sense of urgency  Occasionally if passing gas he will also pass stool  He does not have full incontinence of feces  He sees occasional bright red blood on the toilet paper  The patient has no other lower GI symptomatology  He has never had a colonoscopy  There is a family history of colon cancer in the paternal grandfather and a paternal aunt  He has no other GI symptoms at the present time        Review of Systems:   Constitutional: No fever or chills, feels well, no tiredness, weight loss as above  HENT: No complaints of earache, no hearing loss, no nosebleeds, no nasal discharge, no sore throat, no hoarseness  Eyes: No complaints of eye pain, no red eyes, no discharge from eyes, no itchy eyes  Cardiovascular: No complaints of slow heart rate, no fast heart rate, no chest pain, no palpitations, no leg claudication, no lower extremity edema  Respiratory: No complaints of shortness of breath, no wheezing, no cough, no SOB on exertion, no orthopnea  Gastrointestinal: As noted in HPI  Genitourinary: No complaints of dysuria, no incontinence, no hesitancy, no nocturia  Musculoskeletal: Positive bilateral knee arthralgia, no myalgias, no joint swelling or stiffness, no limb pain or swelling  Neurological: No complaints of headache, no confusion, no convulsions, no numbness or tingling, no dizziness or fainting, no limb weakness, no difficulty walking  Skin: No complaints of skin rash or skin lesions, no itching, no skin wound, no dry skin  Hematological/Lymphatic: No complaints of swollen glands, does not bleed easy  Allergic/Immunologic: No immunocompromised state  Endocrine:  No complaints of polyuria, no polydipsia  Psychiatric/Behavioral: is not suicidal, no sleep disturbances, no anxiety or depression, no change in personality, no emotional problems         Historical Information   Past Medical History:   Diagnosis Date   • Diabetes mellitus ([facility] [address] )    • Hyperlipidemia    • Hypertension      Past Surgical History:   Procedure Laterality Date   • KNEE SURGERY Left 1995   • SURGERY SCROTAL / TESTICULAR Right 2011    maybe 10 years ago     Social History   Social History     Substance and Sexual Activity   Alcohol Use Not Currently   • Alcohol/week: 6 0 standard drinks of alcohol   • Types: 6 Cans of beer per week    Comment: social maybe a 6 pack in one month     Social History     Substance and Sexual Activity   Drug Use Never "    Social History     Tobacco Use   Smoking Status Never   Smokeless Tobacco Never     Family History   Problem Relation Age of Onset   • Diabetes Mother    • Diabetes Father          Current Medications: has a current medication list which includes the following prescription(s): atorvastatin, one touch ultra 2, dulaglutide, onetouch ultra, onetouch ultrasoft, lisinopril, and metformin  Vital Signs: /100   Pulse 88   Ht 5' 10\" (1 778 m)   Wt 126 kg (277 lb)   SpO2 98%   BMI 39 75 kg/m²     Physical Exam:   Constitutional  General Appearance: No acute distress, well appearing and well nourished  Head  Normocephalic  Eyes  Conjunctivae and lids: No swelling, erythema, or discharge  Pupils and irises: Equal, round and reactive to light  Ears, Nose, Mouth, and Throat  External inspection of ears and nose: Normal  Nasal mucosa, septum and turbinates: Normal without edema or erythema/   Oropharynx: Normal with no erythema, edema, exudate or lesions  Neck  Normal range of motion  Neck supple  Cardiovascular  Auscultation of the heart: Normal rate and rhythm, normal S1 and S2 without murmurs  Examination of the extremities for edema and/or varicosities: Normal  Pulmonary/Chest  Respiratory effort: No increased work of breathing or signs of respiratory distress  Auscultation of lungs: Clear to auscultation, equal breath sounds bilaterally, no wheezes, rales, no rhonchi  Abdomen  Abdomen: Non-tender, no masses  Positive ventral hernia, nontender and reducible  Liver and spleen: No hepatomegaly or splenomegaly  Musculoskeletal  Gait and station: normal   Digits and Nails: normal without clubbing or cyanosis  Inspection/palpation of joints, bones, and muscles: Normal  Neurological  No nystagmus or asterixis  Skin  Skin and subcutaneous tissue: Normal without rashes or lesions  Lymphatic  Palpation of the lymph nodes in neck: No lymphadenopathy     Psychiatric  Orientation to person, place " and time: Normal   Mood and affect: Normal          Labs:   Lab Results   Component Value Date    ALT 24 02/07/2023    AST 21 02/07/2023    BUN 19 03/13/2023    CALCIUM 9 6 03/13/2023     03/13/2023    CO2 25 03/13/2023    CREATININE 0 81 03/13/2023    HDL 57 02/07/2023    HCT 43 1 02/07/2023    HGB 14 3 02/07/2023    HGBA1C 6 6 (H) 05/10/2023     02/07/2023    K 4 2 03/13/2023    TRIG 98 02/07/2023    WBC 7 63 02/07/2023         X-Rays & Procedures:   No orders to display         ______________________________________________________________________      Assessment & Plan:      Diagnoses and all orders for this visit:    Change in bowel habits  -     Colonoscopy; Future    Colon cancer screening  -     Ambulatory referral for colonoscopy    Other constipation  -     Colonoscopy; Future    Fecal urgency  -     Colonoscopy; Future    Rectal bleeding  -     Colonoscopy; Future    Family history of colon cancer  -     Colonoscopy; Future        I have taken the liberty of scheduling the patient for colonoscopy  I will be happy to inform you of his results and further recommendations  I would like to thank you for allowing me to participate in his care              With warmest regards,    Jaelyn Rashid MD, [facility]

## 2023-06-22 NOTE — PATIENT INSTRUCTIONS
Scheduled date of colonoscopy (as of today): 9/6/23  Physician performing colonoscopy: Rebeca  Location of colonoscopy: [facility]  Bowel prep reviewed with patient: Jil/Yogesh  Instructions reviewed with patient by: Vesna BARONE  Clearances:

## 2023-08-25 DIAGNOSIS — E11.42 TYPE 2 DIABETES MELLITUS WITH DIABETIC POLYNEUROPATHY, WITHOUT LONG-TERM CURRENT USE OF INSULIN (HCC): ICD-10-CM

## 2023-08-26 RX ORDER — DULAGLUTIDE 1.5 MG/.5ML
INJECTION, SOLUTION SUBCUTANEOUS
Qty: 6 ML | Refills: 1 | Status: SHIPPED | OUTPATIENT
Start: 2023-08-26

## 2023-09-06 ENCOUNTER — ANESTHESIA EVENT (OUTPATIENT)
Dept: GASTROENTEROLOGY | Facility: HOSPITAL | Age: 53
End: 2023-09-06

## 2023-09-06 ENCOUNTER — ANESTHESIA (OUTPATIENT)
Dept: GASTROENTEROLOGY | Facility: HOSPITAL | Age: 53
End: 2023-09-06

## 2023-09-06 ENCOUNTER — HOSPITAL ENCOUNTER (OUTPATIENT)
Dept: GASTROENTEROLOGY | Facility: HOSPITAL | Age: 53
Setting detail: OUTPATIENT SURGERY
Discharge: HOME | End: 2023-09-06
Attending: INTERNAL MEDICINE
Payer: COMMERCIAL

## 2023-09-06 VITALS
BODY MASS INDEX: 42.32 KG/M2 | HEIGHT: 70 IN | TEMPERATURE: 97.5 F | HEART RATE: 92 BPM | RESPIRATION RATE: 20 BRPM | OXYGEN SATURATION: 98 % | DIASTOLIC BLOOD PRESSURE: 80 MMHG | SYSTOLIC BLOOD PRESSURE: 155 MMHG | WEIGHT: 295.64 LBS

## 2023-09-06 DIAGNOSIS — R19.4 CHANGE IN BOWEL HABITS: ICD-10-CM

## 2023-09-06 DIAGNOSIS — K59.09 OTHER CONSTIPATION: ICD-10-CM

## 2023-09-06 DIAGNOSIS — K62.5 RECTAL BLEEDING: ICD-10-CM

## 2023-09-06 DIAGNOSIS — R15.2 FECAL URGENCY: ICD-10-CM

## 2023-09-06 DIAGNOSIS — Z80.0 FAMILY HISTORY OF COLON CANCER: ICD-10-CM

## 2023-09-06 LAB — GLUCOSE SERPL-MCNC: 115 MG/DL (ref 65–140)

## 2023-09-06 PROCEDURE — 88305 TISSUE EXAM BY PATHOLOGIST: CPT | Performed by: PATHOLOGY

## 2023-09-06 PROCEDURE — 45385 COLONOSCOPY W/LESION REMOVAL: CPT | Performed by: INTERNAL MEDICINE

## 2023-09-06 PROCEDURE — 82948 REAGENT STRIP/BLOOD GLUCOSE: CPT

## 2023-09-06 PROCEDURE — 45380 COLONOSCOPY AND BIOPSY: CPT | Performed by: INTERNAL MEDICINE

## 2023-09-06 RX ORDER — SODIUM CHLORIDE, SODIUM LACTATE, POTASSIUM CHLORIDE, CALCIUM CHLORIDE 600; 310; 30; 20 MG/100ML; MG/100ML; MG/100ML; MG/100ML
INJECTION, SOLUTION INTRAVENOUS CONTINUOUS PRN
Status: DISCONTINUED | OUTPATIENT
Start: 2023-09-06 | End: 2023-09-06

## 2023-09-06 RX ORDER — PROPOFOL 10 MG/ML
INJECTION, EMULSION INTRAVENOUS AS NEEDED
Status: DISCONTINUED | OUTPATIENT
Start: 2023-09-06 | End: 2023-09-06

## 2023-09-06 RX ORDER — LIDOCAINE HYDROCHLORIDE 20 MG/ML
INJECTION, SOLUTION EPIDURAL; INFILTRATION; INTRACAUDAL; PERINEURAL AS NEEDED
Status: DISCONTINUED | OUTPATIENT
Start: 2023-09-06 | End: 2023-09-06

## 2023-09-06 RX ADMIN — PROPOFOL 50 MG: 10 INJECTION, EMULSION INTRAVENOUS at 07:23

## 2023-09-06 RX ADMIN — PROPOFOL 50 MG: 10 INJECTION, EMULSION INTRAVENOUS at 07:31

## 2023-09-06 RX ADMIN — PROPOFOL 50 MG: 10 INJECTION, EMULSION INTRAVENOUS at 07:35

## 2023-09-06 RX ADMIN — SODIUM CHLORIDE, SODIUM LACTATE, POTASSIUM CHLORIDE, AND CALCIUM CHLORIDE: .6; .31; .03; .02 INJECTION, SOLUTION INTRAVENOUS at 06:45

## 2023-09-06 RX ADMIN — PROPOFOL 100 MG: 10 INJECTION, EMULSION INTRAVENOUS at 07:18

## 2023-09-06 RX ADMIN — PROPOFOL 50 MG: 10 INJECTION, EMULSION INTRAVENOUS at 07:19

## 2023-09-06 RX ADMIN — LIDOCAINE HYDROCHLORIDE 100 MG: 20 INJECTION, SOLUTION EPIDURAL; INFILTRATION; INTRACAUDAL; PERINEURAL at 07:18

## 2023-09-06 RX ADMIN — PROPOFOL 50 MG: 10 INJECTION, EMULSION INTRAVENOUS at 07:27

## 2023-09-06 NOTE — H&P
History and Physical - [facility]  Anna Marie Rodriguez 46 y.o. male MRN: [id number]                  HPI: Ann aMarie Rodriguez is a 46y.o. year old male who presents for colonoscopy for change in bowel habits, constipation, rectal bleeding, urgency, family history. No prior colonoscopy      REVIEW OF SYSTEMS: Per the HPI, and otherwise unremarkable. Historical Information   Past Medical History:   Diagnosis Date   • Diabetes mellitus ([address])    • Hyperlipidemia    • Hypertension      Past Surgical History:   Procedure Laterality Date   • KNEE SURGERY Left 1995   • SURGERY SCROTAL / TESTICULAR Right 2011    maybe 10 years ago     Social History   Social History     Substance and Sexual Activity   Alcohol Use Not Currently   • Alcohol/week: 6.0 standard drinks of alcohol   • Types: 6 Cans of beer per week    Comment: social maybe a 6 pack in one month     Social History     Substance and Sexual Activity   Drug Use Yes   • Types: Marijuana     Social History     Tobacco Use   Smoking Status Never   Smokeless Tobacco Never     Family History   Problem Relation Age of Onset   • Diabetes Mother    • Diabetes Father        Meds/Allergies     (Not in a hospital admission)      No Known Allergies    Objective     Blood pressure (!) 185/99, pulse 100, temperature 97.7 °F (36.5 °C), temperature source Temporal, resp. rate 20, height 5' 10" (1.778 m), weight 134 kg (295 lb 10.2 oz), SpO2 98 %.       PHYSICAL EXAM    Gen: NAD  CV: RRR  CHEST: Clear  ABD: soft, NT/ND  EXT: no edema  Neuro: AAO      ASSESSMENT/PLAN:  This is a 46y.o. year old male here for change in bowel habits with constipation, urgency, rectal bleeding, family history    PLAN:   Procedure: Colonoscopy

## 2023-09-06 NOTE — ANESTHESIA PREPROCEDURE EVALUATION
Procedure:  COLONOSCOPY    Relevant Problems   CARDIO   (+) Hyperlipidemia   (+) Primary hypertension      ENDO   (+) Type 2 diabetes mellitus with diabetic polyneuropathy, without long-term current use of insulin (HCC)      Other   (+) Morbid obesity with BMI of 40.0-44.9, adult (HCC)     Glucose 922  Last trulicity dose      Physical Exam    Airway    Mallampati score: II  TM Distance: >3 FB  Neck ROM: full     Dental       Cardiovascular      Pulmonary      Other Findings        Anesthesia Plan  ASA Score- 3     Anesthesia Type- IV sedation with anesthesia with ASA Monitors. Additional Monitors:   Airway Plan:     Comment: Recent labs personally reviewed:  Lab Results       Component                Value               Date                       WBC                      7.63                02/07/2023                 HGB                      14.3                02/07/2023                 PLT                      211                 02/07/2023            Lab Results       Component                Value               Date                       K                        4.2                 03/13/2023                 BUN                      19                  03/13/2023                 CREATININE               0.81                03/13/2023            No results found for: "PTT"   No results found for: "INR"    Blood type     I, Effie Phelps MD, have personally seen and evaluated the patient prior to anesthetic care. I have reviewed the pre-anesthetic record, medical history, allergies, medications and any other medical records if appropriate to the anesthetic care. If a CRNA is involved in the case, I have reviewed the CRNA assessment, if present, and agree. Patient consented for IV Sedation, general anesthesia as back up. Discussed risks of aspiration, IV infiltration, indications for conversion to general anesthesia. All questions and concerns addressed.    .       Plan Factors-Exercise tolerance (METS): >4 METS.    Chart reviewed. Existing labs reviewed. Patient summary reviewed. Patient is a current smoker. Patient instructed to abstain from smoking on day of procedure. Patient did not smoke on day of surgery. Obstructive sleep apnea risk education given perioperatively. Induction- intravenous. Postoperative Plan-     Informed Consent- Anesthetic plan and risks discussed with patient. I personally reviewed this patient with the CRNA. Discussed and agreed on the Anesthesia Plan with the CRNA. Dashawn Washburn

## 2023-09-06 NOTE — ANESTHESIA POSTPROCEDURE EVALUATION
Post-Op Assessment Note    CV Status:  Stable    Pain management: adequate     Mental Status:  Alert and awake   Hydration Status:  Euvolemic   PONV Controlled:  Controlled   Airway Patency:  Patent      Post Op Vitals Reviewed: Yes      Staff: Anesthesiologist, CRNA         There were no known notable events for this encounter.     /69 (09/06/23 0744)    Temp      Pulse 92 (09/06/23 0744)   Resp 19 (09/06/23 0744)    SpO2 98 % (09/06/23 0744)

## 2023-09-08 PROCEDURE — 88305 TISSUE EXAM BY PATHOLOGIST: CPT | Performed by: PATHOLOGY

## 2023-09-13 ENCOUNTER — TELEPHONE (OUTPATIENT)
Age: 53
End: 2023-09-13

## 2023-11-12 DIAGNOSIS — E11.42 TYPE 2 DIABETES MELLITUS WITH DIABETIC POLYNEUROPATHY, WITHOUT LONG-TERM CURRENT USE OF INSULIN (HCC): ICD-10-CM

## 2023-11-14 DIAGNOSIS — E11.42 TYPE 2 DIABETES MELLITUS WITH DIABETIC POLYNEUROPATHY, WITHOUT LONG-TERM CURRENT USE OF INSULIN (HCC): ICD-10-CM

## 2023-11-14 RX ORDER — DULAGLUTIDE 1.5 MG/.5ML
1.5 INJECTION, SOLUTION SUBCUTANEOUS
Qty: 6 ML | Refills: 0 | Status: SHIPPED | OUTPATIENT
Start: 2023-11-14

## 2023-11-14 RX ORDER — DULAGLUTIDE 1.5 MG/.5ML
1.5 INJECTION, SOLUTION SUBCUTANEOUS
Qty: 6 ML | Refills: 0 | Status: CANCELLED | OUTPATIENT
Start: 2023-11-14

## 2024-01-30 ENCOUNTER — OFFICE VISIT (OUTPATIENT)
Age: 54
End: 2024-01-30
Payer: COMMERCIAL

## 2024-01-30 VITALS
BODY MASS INDEX: 48.21 KG/M2 | RESPIRATION RATE: 18 BRPM | SYSTOLIC BLOOD PRESSURE: 144 MMHG | TEMPERATURE: 97.5 F | WEIGHT: 315 LBS | HEART RATE: 90 BPM | OXYGEN SATURATION: 97 % | DIASTOLIC BLOOD PRESSURE: 80 MMHG

## 2024-01-30 DIAGNOSIS — E11.42 TYPE 2 DIABETES MELLITUS WITH DIABETIC POLYNEUROPATHY, WITHOUT LONG-TERM CURRENT USE OF INSULIN (HCC): ICD-10-CM

## 2024-01-30 DIAGNOSIS — J18.9 COMMUNITY ACQUIRED PNEUMONIA, UNSPECIFIED LATERALITY: Primary | ICD-10-CM

## 2024-01-30 DIAGNOSIS — I10 PRIMARY HYPERTENSION: ICD-10-CM

## 2024-01-30 LAB — SL AMB POCT HEMOGLOBIN AIC: 8 (ref ?–6.5)

## 2024-01-30 PROCEDURE — 3052F HG A1C>EQUAL 8.0%<EQUAL 9.0%: CPT | Performed by: FAMILY MEDICINE

## 2024-01-30 PROCEDURE — 99214 OFFICE O/P EST MOD 30 MIN: CPT | Performed by: FAMILY MEDICINE

## 2024-01-30 PROCEDURE — 3079F DIAST BP 80-89 MM HG: CPT | Performed by: FAMILY MEDICINE

## 2024-01-30 PROCEDURE — 4010F ACE/ARB THERAPY RXD/TAKEN: CPT | Performed by: FAMILY MEDICINE

## 2024-01-30 PROCEDURE — 83036 HEMOGLOBIN GLYCOSYLATED A1C: CPT | Performed by: FAMILY MEDICINE

## 2024-01-30 PROCEDURE — 3077F SYST BP >= 140 MM HG: CPT | Performed by: FAMILY MEDICINE

## 2024-01-30 RX ORDER — AZITHROMYCIN 250 MG/1
TABLET, FILM COATED ORAL DAILY
Qty: 6 TABLET | Refills: 0 | Status: SHIPPED | OUTPATIENT
Start: 2024-01-30 | End: 2024-02-04

## 2024-01-30 RX ORDER — ALBUTEROL SULFATE 90 UG/1
2 AEROSOL, METERED RESPIRATORY (INHALATION) EVERY 6 HOURS PRN
Qty: 18 G | Refills: 5 | Status: SHIPPED | OUTPATIENT
Start: 2024-01-30

## 2024-01-30 RX ORDER — LISINOPRIL 40 MG/1
40 TABLET ORAL DAILY
Qty: 90 TABLET | Refills: 0 | Status: SHIPPED | OUTPATIENT
Start: 2024-01-30 | End: 2024-04-29

## 2024-01-30 NOTE — LETTER
January 30, 2024     Patient: Scott Delgado  YOB: 1970  Date of Visit: 1/30/2024      To Whom it May Concern:    Scott Delgado is under my professional care. Scott was seen in my office on 1/30/2024. Scott may return to work on 2/1/2024 .    If you have any questions or concerns, please don't hesitate to call.         Sincerely,          Dr Francy Boykin DO.

## 2024-01-30 NOTE — PROGRESS NOTES
[facility]  NICU Progress Note    Alin Collado Patient Status:      [birth year]22 MRN [id number]   Location [facility] NICU Attending Catie Barajas MD   Hosp Day # 29 days   GA at birth: Gestational Age: 27w1d   Corrected GA: 31w 2d         Problem List:  Principal Problem:    HMD (hyaline membrane disease)  Active Problems:    Prematurity, 1,000-1,249 grams, 27-28 completed weeks    Poor feeding    Observation for suspected condition      Interval events:   The infant remains stable on 1L nasal cannula at 21% oxygen. Last bradycardia/desaturation event on , which was self-limiting, following eye exam.  Tolerating feedings, Weight change: 40 g.   1 month head ultrasound was reported as normal    Maternal breast milk currently being held as Mother is on a bactrim derivative.    Weight:  Wt Readings from Last 1 Encounters:   22 (!) 1385 g (33 %, Z= -0.43)*     * Growth percentiles are based on Rosalind (Girls, 22-50 Weeks) data.     Weight change: 40 g    Fluids/Nutrition:  Feedings of fortified EBM/DBM at 88mMA2se  156mL/kg/day    I/O last 3 completed shifts:  In: 216 [NG/GT:216]  Out: -     Access/Lines:   S/P PICC    Respiratory Support:   Respiratory Support Last Value   Nasal Cannula Flow 1 L/min (22 0200)   FiO2 21 % (22 0200)       Labs:    Recent Results (from the past 24 hour(s))   Reticulocyte Count Automated    Collection Time: 22  8:32 AM   Result Value Ref Range    Retic ABS 76 10 - 120 K/mcL    Immature Retic Frac 35.2 %    Retic Count 2.1 0.3 - 2.5 %    Reticulocyte Hemoglobin Content 27.1 (L) 28.6 - 36.3 pg   CBC with Automated Differential (performable only)    Collection Time: 22  8:32 AM   Result Value Ref Range    WBC 9.2 5.0 - 19.5 K/mcL    RBC 3.65 3.00 - 5.40 mil/mcL    HGB 11.2 10.0 - 18.0 g/dL    HCT 32.5 31.0 - 55.0 %    MCV 89.0 86.0 - 124.0 fl    MCH 30.7 28.0 - 40.0 pg    MCHC 34.5 28.0 - 38.0 g/dL     Name: Scott Delgado      : 1970      MRN: [id number]  Encounter Provider: Francy Boykin DO  Encounter Date: 2024   Encounter department: [facility] PRIMARY CARE [place]    Assessment & Plan     1. Community acquired pneumonia, unspecified laterality-lung exam significant for rales in the lower left quadrant of the lung field.  Will treat with antibiotic therapy and provide bronchodilator for the breathlessness.  Follow-up in 3 weeks  -     azithromycin (Zithromax) 250 mg tablet; Take 2 tablets (500 mg total) by mouth daily for 1 day, THEN 1 tablet (250 mg total) daily for 4 days.  -     albuterol (Ventolin HFA) 90 mcg/act inhaler; Inhale 2 puffs every 6 (six) hours as needed for wheezing    2. Primary hypertension-has run out of his antihypertensive therapy.  Today BP elevated due to this fact.  Will refill.  -     lisinopril (ZESTRIL) 40 mg tablet; Take 1 tablet (40 mg total) by mouth daily    3. Type 2 diabetes mellitus with diabetic polyneuropathy, without long-term current use of insulin ([facility])-poorly controlled.  Last A1c 6.6.  Today's POC A1c 8.0.  Will discuss dietary changes and antihyperglycemic regimen changes at follow-up  -     POCT hemoglobin A1c  -     Albumin / creatinine urine ratio; Future; Expected date: 2024  -     Comprehensive metabolic panel; Future; Expected date: 2024  -     Lipid Panel with Direct LDL reflex; Future; Expected date: 2024           Subjective      Patient complains of sob and chest congestion x 5 days. Was in the city over the weekend and it was hard to not stop and rest due to the SOB.  Semiproductive cough.  Denies hemoptysis.  Using otc mucinex  and it helps with the chest congestion mildly.  Positive sick contacts at work.        Review of Systems   Constitutional:  Negative for fever.   HENT:  Positive for congestion and rhinorrhea. Negative for sore throat.    Respiratory:  Positive for chest tightness, shortness of  RDW-CV 14.4 11.0 - 15.0 %    RDW-SD 46.2 35.0 - 47.0 fL     140 - 450 K/mcL    NRBC 0 <=0 /100 WBC   Manual Differential    Collection Time: 22  8:32 AM   Result Value Ref Range    Neutrophil, Percent 14 %    Lymphocytes, Percent 68 %    Mono, Percent 8 %    Eosinophils, Percent 6 %    Basophils, Percent 0 %    Bands, Percent 1 0 - 10 %    Metamyelocytes, Percent  1 0 - 2 %    Reactive Lymphocytes, Percent 2 0 - 5 %    Absolute Neutrophil 1.4 1.0 - 9.0 K/mcL    Absolute Lymphocytes 6.4 2.5 - 16.5 K/mcL    Absolute Monocytes 0.7 0.1 - 1.1 K/mcL    Absolute Eosinophils 0.6 0.0 - 0.7 K/mcL    Absolute Basophils 0.0 0.0 - 0.6 K/mcL    WBC Morphology Normal Normal    Platelet Morphology Normal Normal    Polychromasia Few      Imaging:  No new imaging in the last 48 hours.      Current medications:   Current Facility-Administered Medications   Medication Dose Route Frequency Provider Last Rate Last Admin   • caffeine citrate ORAL (CAFCIT) 20 MG/ML  oral solution 13.2 mg  10 mg/kg Oral Q24H Daysi Sood MD   13.2 mg at 22 1343   • cholecalciferol (VITAMIN D) 10 mcg (400 units)/mL oral liquid 10 mcg  10 mcg Per NG tube Daily Catie Barajas MD   10 mcg at 22 0751   • pediatric multivitamin with iron (POLY-VI-SOL WITH IRON) oral solution 0.5 mL  0.5 mL Per NG tube Q24H Daysi Sood MD   0.5 mL at 22 1342     Date of Delivery: 2022  Time of Delivery: 8:21 AM  Delivery Type: , Low Transverse  Neonatology attended a repeat CS for fetal intolerance to labor at 27 1/7 weeks  gestation per OB request. The mother is a 35 yrs old G4L3 A/F. The prenatal course was complicated by maternal  labor and premature prolonged ROM (PPROM) from 22 @ 1930. The mom received magnesium sulfate for last 2 hours PTD. The mother had received steroids on  and .  The mat GBS neg, Mom had received Ampicillin 8 doses and Azithromycin. No mat fever or HT. H/O mat  breath and wheezing.    Cardiovascular:  Negative for leg swelling.       Current Outpatient Medications on File Prior to Visit   Medication Sig    atorvastatin (LIPITOR) 10 mg tablet TAKE 1 TABLET BY MOUTH EVERY DAY    Blood Glucose Monitoring Suppl (ONE TOUCH ULTRA 2) w/Device KIT Use in the morning    dulaglutide (Trulicity) 1.5 MG/0.5ML injection Inject 0.5 mL (1.5 mg total) under the skin every 7 days    dulaglutide (Trulicity) 1.5 MG/0.5ML injection Inject 0.5 mL (1.5 mg total) under the skin every 7 days    glucose blood (OneTouch Ultra) test strip Use to check blood sugar every morning    Lancets (onetouch ultrasoft) lancets Use to check blood sugar every morning    metFORMIN (GLUCOPHAGE) 1000 MG tablet TAKE 1 TABLET BY MOUTH EVERY DAY WITH BREAKFAST    [DISCONTINUED] lisinopril (ZESTRIL) 40 mg tablet Take 1 tablet (40 mg total) by mouth daily       Objective     /80 (BP Location: Left arm, Patient Position: Sitting, Cuff Size: Standard)   Pulse 90   Temp 97.5 °F (36.4 °C) (Temporal)   Resp 18   Wt (!) 152 kg (336 lb)   SpO2 97%   BMI 48.21 kg/m²     Physical Exam  Constitutional:       Appearance: He is ill-appearing. He is not toxic-appearing.   HENT:      Head: Normocephalic.      Right Ear: External ear normal.      Left Ear: External ear normal.      Mouth/Throat:      Mouth: Mucous membranes are dry.   Eyes:      Conjunctiva/sclera: Conjunctivae normal.   Cardiovascular:      Rate and Rhythm: Normal rate and regular rhythm.   Pulmonary:      Effort: Pulmonary effort is normal.      Breath sounds: Rales present.      Comments: Decreased aeration into the bases.     Neurological:      Mental Status: He is alert and oriented to person, place, and time.   Psychiatric:         Mood and Affect: Mood normal.         Behavior: Behavior normal.       Francy Boykin,      gest diabetes in previous pregnancy.       The mom is AB+, HepBSAg neg, RPR NR, HIV neg.   Cord clamping=45 seconds   Apgars 6 (1 for color, 1 for tone and one for resp, 2 for HR) and 8 ( 1 for color and tone each) at 1 and 5 mins respectively.   The baby was bulb suctioned and stimulated. The baby was placed in plastic wrap (raincoat) immediately after birth. The baby had shallow resp efforts, so was intubated at 2 mins of age with # 2.5 ETT without stylet at first attempt. Pl see ETT intubation note. The baby received IPPV via ETT with up to 30 % FiO2. 2.5 ml/kg Curosurf was administered at 12 mins of age with a good tolerance.      Physical Exam:  Visit Vitals  BP 62/48   Pulse 154   Temp 97.9 °F (36.6 °C) (Axillary)   Resp 34   Ht 16.34\" (41.5 cm)   Wt (!) 1385 g   HC 28 cm (11.02\")   SpO2 96%   BMI 8.04 kg/m²      Gen: Awake, quiet alert, active with exam. Non-distressed in isolette.  Skin: Warm, pink, well perfused.  No edema and, not jaundiced.   HEENT: Anterior fontanelle is flat, soft and open. Eyes are without discharge. Nasal cannula in place.   CV: RRR, no murmur appreciated.  Brachial pulses equal. <3 second capillary refill.  Pulm: Clear to auscultation bilaterally.  No retractions appreciated on exam.   Abd: Flat, soft, non-tender. No masses appreciated. Bowel sounds are active.   Ext: Equal, spontaneous movement of all extremities.   Neuro: Normal tone and activity. + suck.         Assessment and Plan:  Girl Heena Collado is an ex-Gestational Age: 27w1d infant born by , Low Transverse.  Problems as listed above in problem list.    RESP: RDS.  Mother received betamethasone for fetal lung maturity -.  Curosurf x 1 in OR.  Initially intubated and then able to extubate to DANILO on DOL 1.  CXR consistent with RDS. Transitioned from NIMV to HFNC on . Currently  on 1L, 21% nasal cannula.    CV: No PDA, mild MI per ECHO form 11/21.  No murmur. Continue to monitor.    FEN/GI: poor PO  feeder  On admission, was NPO.  Initial hypoglycemia required D10 bolus x 2.  Glucose normalized.  Started TF at 6h of life with maternal BM or dBM.  Tolerated well.    Feeds held 11/16 PM-11/17 for small amount fresh blood in OG. Likely from orogastric tube irritation, improved 11/17. Restarted trophic feeds 11/18, which she tolerated.    Intermittent  feeding intolerance typical for preemie. Off TPN 11/26. On prolacta +6, cream added on 11/30 and growth improved. Monitor for adequate growth. Status post prolacta +10, tolerating feedings well, 30 kcal per ounce.   Tolerating current feedings.  Alkaline phosphatase 382 on 12/5. Calcium and phos were within normal range on 12/12.    Low vitamin D level- improving, vitamin D and multivitamin was started on 12/5,  Vitamin D level ordered for 12/12 was low but improved compared to last check. Per discussion with nutrition, is on adequate dose, will continue and repeat on Monday.    ID: Sepsis is considered ruled out.    Mother with PPROM and PTL.  Partial sepsis work after birth was unremarkable. Blood culture no growth. Completed 36h of empiric antibtiotic therapy.      Neuro:   Head US on DOL # 7, on 11/21 showed no IVH, no hydrocephalus.   s/p mother on magnesium  prophylaxis for neuro protection.  Exam appropriate for age.  S/p caffeine load and currently on maintenance therapy.  At risk for neurodevelopmental sequelae due to extreme prematurity.   1 month head ultrasound showed no IVH or hydrocephalus on 12/12.    Bilirubin: Hyperbilirubinemia of prematurity, treated with phototherapy.  Started on prophylactic therapy.  Subsequently treated off and on with double phototherapy. Phototherapy discontinued 11/24. Stable level on 11/25, slight increase on 11/28, stable on 11/30, down-trending on 12/2    Vascular access:UVC from 11/14-11/21, PICC 11/21-11/28    Communication with family:  I updated this mother by phone on 12/13.    Discharge planning/Health  Maintenance:  1)  screens: - in range  2) CCHD screen: echo   3) Hearing screen: prior to discharge  4) Carseat challenge:prior to discharge  5) Immunizations:  Immunization History   Administered Date(s) Administered   • None   Deferred Date(s) Deferred   • Hep B, adolescent or pediatric 2022   6) Screening HUS: #1   no IVH  7) ROP exam: Qualifies      Plan   Resp: follow respiratory effort on weaning nasal cannula, follow oxygen requirement. Follow for alarms, on caffeine.   CV: follow heart rate, blood pressure and perfusion.  FEN: Follow on advancing feedings as needed to maintain total fluid goal. Encourage po per cues. Follow output and growth. Vitamin D improving on supplementation.  Maternal breastmilk is being held because mother was on Bactrim derivative, last dose on 12/15. Continue to follow alk phos.   Heme: Follow bilirubin clinically. Follow HCt and reticulocyte count weekly.    ID: follow clinically for signs/symptoms of infection.   Neuro: follow clinically. 1 month head ultrasound () shows no IVH or hydrocephalus. Infant remains hemodynamically stable.

## 2024-02-06 ENCOUNTER — PATIENT MESSAGE (OUTPATIENT)
Age: 54
End: 2024-02-06

## 2024-02-06 DIAGNOSIS — E11.42 TYPE 2 DIABETES MELLITUS WITH DIABETIC POLYNEUROPATHY, WITHOUT LONG-TERM CURRENT USE OF INSULIN (HCC): Primary | ICD-10-CM

## 2024-02-21 ENCOUNTER — APPOINTMENT (OUTPATIENT)
Age: 54
End: 2024-02-21
Payer: COMMERCIAL

## 2024-02-21 ENCOUNTER — RA CDI HCC (OUTPATIENT)
Dept: OTHER | Facility: HOSPITAL | Age: 54
End: 2024-02-21

## 2024-02-21 DIAGNOSIS — E11.42 TYPE 2 DIABETES MELLITUS WITH DIABETIC POLYNEUROPATHY, WITHOUT LONG-TERM CURRENT USE OF INSULIN (HCC): ICD-10-CM

## 2024-02-21 LAB
ALBUMIN SERPL BCP-MCNC: 3.9 G/DL (ref 3.5–5)
ALP SERPL-CCNC: 91 U/L (ref 34–104)
ALT SERPL W P-5'-P-CCNC: 18 U/L (ref 7–52)
ANION GAP SERPL CALCULATED.3IONS-SCNC: 8 MMOL/L
AST SERPL W P-5'-P-CCNC: 23 U/L (ref 13–39)
BILIRUB SERPL-MCNC: 0.86 MG/DL (ref 0.2–1)
BUN SERPL-MCNC: 14 MG/DL (ref 5–25)
CALCIUM SERPL-MCNC: 10 MG/DL (ref 8.4–10.2)
CHLORIDE SERPL-SCNC: 102 MMOL/L (ref 96–108)
CO2 SERPL-SCNC: 25 MMOL/L (ref 21–32)
CREAT SERPL-MCNC: 0.72 MG/DL (ref 0.6–1.3)
GFR SERPL CREATININE-BSD FRML MDRD: 106 ML/MIN/1.73SQ M
GLUCOSE P FAST SERPL-MCNC: 186 MG/DL (ref 65–99)
POTASSIUM SERPL-SCNC: 4.2 MMOL/L (ref 3.5–5.3)
PROT SERPL-MCNC: 7.3 G/DL (ref 6.4–8.4)
SODIUM SERPL-SCNC: 135 MMOL/L (ref 135–147)

## 2024-02-21 PROCEDURE — 82570 ASSAY OF URINE CREATININE: CPT

## 2024-02-21 PROCEDURE — 82043 UR ALBUMIN QUANTITATIVE: CPT

## 2024-02-21 PROCEDURE — 80053 COMPREHEN METABOLIC PANEL: CPT

## 2024-02-21 PROCEDURE — 36415 COLL VENOUS BLD VENIPUNCTURE: CPT

## 2024-02-22 LAB
CREAT UR-MCNC: 94 MG/DL
MICROALBUMIN UR-MCNC: 2682.9 MG/L
MICROALBUMIN/CREAT 24H UR: 2854 MG/G CREATININE (ref 0–30)

## 2024-02-28 ENCOUNTER — OFFICE VISIT (OUTPATIENT)
Age: 54
End: 2024-02-28
Payer: COMMERCIAL

## 2024-02-28 VITALS
SYSTOLIC BLOOD PRESSURE: 172 MMHG | HEART RATE: 100 BPM | TEMPERATURE: 97.5 F | DIASTOLIC BLOOD PRESSURE: 76 MMHG | RESPIRATION RATE: 18 BRPM | WEIGHT: 315 LBS | OXYGEN SATURATION: 98 % | BODY MASS INDEX: 46.12 KG/M2

## 2024-02-28 DIAGNOSIS — R80.9 DIABETES MELLITUS WITH MICROALBUMINURIA: ICD-10-CM

## 2024-02-28 DIAGNOSIS — Z00.00 ANNUAL PHYSICAL EXAM: Primary | ICD-10-CM

## 2024-02-28 DIAGNOSIS — E11.42 TYPE 2 DIABETES MELLITUS WITH DIABETIC POLYNEUROPATHY, WITHOUT LONG-TERM CURRENT USE OF INSULIN (HCC): ICD-10-CM

## 2024-02-28 DIAGNOSIS — I10 PRIMARY HYPERTENSION: ICD-10-CM

## 2024-02-28 DIAGNOSIS — E78.2 MIXED HYPERLIPIDEMIA: ICD-10-CM

## 2024-02-28 DIAGNOSIS — K42.9 UMBILICAL HERNIA WITHOUT OBSTRUCTION AND WITHOUT GANGRENE: ICD-10-CM

## 2024-02-28 DIAGNOSIS — E11.29 DIABETES MELLITUS WITH MICROALBUMINURIA: ICD-10-CM

## 2024-02-28 PROCEDURE — 99396 PREV VISIT EST AGE 40-64: CPT | Performed by: FAMILY MEDICINE

## 2024-02-28 PROCEDURE — 99214 OFFICE O/P EST MOD 30 MIN: CPT | Performed by: FAMILY MEDICINE

## 2024-02-28 RX ORDER — VALSARTAN AND HYDROCHLOROTHIAZIDE 160; 25 MG/1; MG/1
1 TABLET ORAL DAILY
Qty: 30 TABLET | Refills: 0 | Status: SHIPPED | OUTPATIENT
Start: 2024-02-28

## 2024-02-28 NOTE — PROGRESS NOTES
ADULT ANNUAL PHYSICAL  [facility] PRIMARY CARE [place]    NAME: Scott Delgado  AGE: 53 y.o. SEX: male  : 1970     DATE: 2024     Assessment and Plan:     Problem List Items Addressed This Visit       Umbilical hernia without obstruction and without gangrene-unchanged.  Reducible on exam.  Bowel sounds heard.  Patient encouraged to continue with weight loss with a goal of surgical repair.    Diabetes with microalbuminuria  Type 2 diabetes mellitus with diabetic polyneuropathy, without long-term current use of insulin (HCC)-not well-controlled.  Recent A1c 8.0.  Trulicity was increased to 3 mg q. weekly.  Refill provided.  Continue metformin 1000 mg twice daily    Relevant Medications    dulaglutide (Trulicity) 3 MG/0.5ML injection    Other Relevant Orders    Diabetic foot exam    Primary hypertension-untreated.  Currently asymptomatic.  Patient stopped lisinopril therapy a month ago due to possible side effects.  Will switch to ARB HCTZ therapy.  Patient will monitor his BP over the weekend.  He will follow-up in 1 week for nurse BP check and oriented better titrate dosing    Relevant Medications    valsartan-hydrochlorothiazide (DIOVAN-HCT) 160-25 MG per tablet    Hyperlipidemia-controlled on statin.     Other Visit Diagnoses       Annual physical exam    -  Primary              Immunizations and preventive care screenings were discussed with patient today. Appropriate education was printed on patient's after visit summary.      Counseling:  Exercise: the importance of regular exercise/physical activity was discussed. Recommend exercise 3-5 times per week for at least 30 minutes.          Return in about 4 months (around 2024) for Next scheduled follow up.     Chief Complaint:     Chief Complaint   Patient presents with    Annual Exam     Pt is here for his yearly visit      History of Present Illness:     Adult Annual Physical   Patient here for a  comprehensive physical exam.     Diet and Physical Activity  Diet/Nutrition: diabetic diet.   Exercise: moderate cardiovascular exercise.      Depression Screening  PHQ-2/9 Depression Screening    Little interest or pleasure in doing things: 0 - not at all  Feeling down, depressed, or hopeless: 0 - not at all  PHQ-2 Score: 0  PHQ-2 Interpretation: Negative depression screen       General Health  Sleep: sleeps well.   Hearing: normal - bilateral.  Vision: wears glasses.   Dental: brushes teeth once daily.        Health  Symptoms include: none       Review of Systems:     Review of Systems   HENT:  Positive for congestion and postnasal drip.    Respiratory:  Negative for shortness of breath.    Cardiovascular:  Negative for chest pain.   Gastrointestinal:  Positive for constipation. Negative for diarrhea.   Neurological:  Negative for headaches.      Past Medical History:     Past Medical History:   Diagnosis Date    Diabetes mellitus (HCC)     Hyperlipidemia     Hypertension       Past Surgical History:     Past Surgical History:   Procedure Laterality Date    KNEE SURGERY Left 1995    SURGERY SCROTAL / TESTICULAR Right 2011    maybe 10 years ago      Family History:     Family History   Problem Relation Age of Onset    Diabetes Mother     Diabetes Father       Social History:     Social History     Socioeconomic History    Marital status: /Civil Union     Spouse name: None    Number of children: None    Years of education: None    Highest education level: None   Occupational History    None   Tobacco Use    Smoking status: Never    Smokeless tobacco: Never   Vaping Use    Vaping status: Never Used   Substance and Sexual Activity    Alcohol use: Not Currently     Alcohol/week: 6.0 standard drinks of alcohol     Types: 6 Cans of beer per week     Comment: social maybe a 6 pack in one month    Drug use: Yes     Types: Marijuana    Sexual activity: None   Other Topics Concern    None   Social History Narrative     None     Social Determinants of Health     Financial Resource Strain: Not on file   Food Insecurity: Not on file   Transportation Needs: Not on file   Physical Activity: Not on file   Stress: Not on file   Social Connections: Not on file   Intimate Partner Violence: Not on file   Housing Stability: Not on file      Current Medications:     Current Outpatient Medications   Medication Sig Dispense Refill    albuterol (Ventolin HFA) 90 mcg/act inhaler Inhale 2 puffs every 6 (six) hours as needed for wheezing 18 g 5    atorvastatin (LIPITOR) 10 mg tablet TAKE 1 TABLET BY MOUTH EVERY DAY 90 tablet 1    Blood Glucose Monitoring Suppl (ONE TOUCH ULTRA 2) w/Device KIT Use in the morning 1 kit 0    dulaglutide (Trulicity) 3 MG/0.5ML injection Inject 0.5 mL (3 mg total) under the skin every 7 days 6 mL 1    glucose blood (OneTouch Ultra) test strip Use to check blood sugar every morning 100 strip 3    Lancets (onetouch ultrasoft) lancets Use to check blood sugar every morning 100 each 3    metFORMIN (GLUCOPHAGE) 1000 MG tablet TAKE 1 TABLET BY MOUTH EVERY DAY WITH BREAKFAST 90 tablet 1    valsartan-hydrochlorothiazide (DIOVAN-HCT) 160-25 MG per tablet Take 1 tablet by mouth daily 30 tablet 0     No current facility-administered medications for this visit.      Allergies:     No Known Allergies   Physical Exam:     BP (!) 172/76 (BP Location: Left arm, Patient Position: Sitting, Cuff Size: Large)   Pulse 100   Temp 97.5 °F (36.4 °C) (Temporal)   Resp 18   Wt (!) 146 kg (321 lb 6.4 oz)   SpO2 98%   BMI 46.12 kg/m²     Physical Exam  Constitutional:       Appearance: He is obese.   HENT:      Head: Normocephalic and atraumatic.      Right Ear: Tympanic membrane and external ear normal.      Left Ear: Tympanic membrane and external ear normal.      Mouth/Throat:      Mouth: Mucous membranes are moist.      Pharynx: Oropharynx is clear.   Eyes:      Extraocular Movements: Extraocular movements intact.       Conjunctiva/sclera: Conjunctivae normal.      Pupils: Pupils are equal, round, and reactive to light.   Cardiovascular:      Rate and Rhythm: Normal rate and regular rhythm.      Pulses: no weak pulses.           Dorsalis pedis pulses are 2+ on the right side and 2+ on the left side.      Heart sounds: No murmur heard.  Pulmonary:      Effort: Pulmonary effort is normal.      Breath sounds: Normal breath sounds.   Abdominal:      General: Bowel sounds are normal.      Palpations: Abdomen is soft.      Tenderness: There is no guarding or rebound.      Hernia: A hernia is present.   Feet:      Right foot:      Skin integrity: No ulcer, callus or dry skin.      Left foot:      Skin integrity: No ulcer, callus or dry skin.   Neurological:      Mental Status: He is alert and oriented to person, place, and time.      Gait: Gait normal.   Psychiatric:         Mood and Affect: Mood normal.         Behavior: Behavior normal.        Patient's shoes and socks removed.    Right Foot/Ankle   Right Foot Inspection  Skin Exam: skin intact. No dry skin, no callus, no ulcer and no callus.     Toe Exam: No swelling    Sensory   Monofilament testing: intact    Vascular  Capillary refills: < 3 seconds  The right DP pulse is 2+.     Left Foot/Ankle  Left Foot Inspection  Skin Exam: skin intact. No dry skin, no ulcer and no callus.     Toe Exam: No swelling.     Sensory   Monofilament testing: intact    Vascular  Capillary refills: < 3 seconds  The left DP pulse is 2+.     Assign Risk Category  No deformity present  No loss of protective sensation  No weak pulses  Risk: 0       Francy Boykin DO  [facility] PRIMARY CARE [place]

## 2024-03-13 ENCOUNTER — TELEPHONE (OUTPATIENT)
Age: 54
End: 2024-03-13

## 2024-03-13 ENCOUNTER — CLINICAL SUPPORT (OUTPATIENT)
Age: 54
End: 2024-03-13

## 2024-03-13 DIAGNOSIS — I10 HYPERTENSION, UNSPECIFIED TYPE: Primary | ICD-10-CM

## 2024-03-13 NOTE — TELEPHONE ENCOUNTER
3/13/24 Patient. Came in at 3:45 for blood pressure check today and was 180/110 with our wall machine and with dinamap was 183/84 stated his vision was off and spoke to Dr. Lora and she advised he go to er to be checked out patient. Was notified and stated he will do this.

## 2024-03-24 DIAGNOSIS — I10 PRIMARY HYPERTENSION: ICD-10-CM

## 2024-03-25 RX ORDER — VALSARTAN AND HYDROCHLOROTHIAZIDE 160; 25 MG/1; MG/1
1 TABLET ORAL DAILY
Qty: 90 TABLET | Refills: 1 | Status: SHIPPED | OUTPATIENT
Start: 2024-03-25

## 2024-05-08 LAB
LEFT EYE DIABETIC RETINOPATHY: POSITIVE
RIGHT EYE DIABETIC RETINOPATHY: POSITIVE
SEVERITY (EYE EXAM): NORMAL

## 2024-05-29 ENCOUNTER — RA CDI HCC (OUTPATIENT)
Dept: OTHER | Facility: HOSPITAL | Age: 54
End: 2024-05-29

## 2024-06-04 DIAGNOSIS — I10 PRIMARY HYPERTENSION: ICD-10-CM

## 2024-06-04 RX ORDER — VALSARTAN AND HYDROCHLOROTHIAZIDE 160; 25 MG/1; MG/1
1 TABLET ORAL DAILY
Qty: 90 TABLET | Refills: 1 | Status: SHIPPED | OUTPATIENT
Start: 2024-06-04

## 2024-06-04 RX ORDER — VALSARTAN AND HYDROCHLOROTHIAZIDE 160; 25 MG/1; MG/1
1 TABLET ORAL DAILY
Qty: 90 TABLET | Refills: 2 | Status: SHIPPED | OUTPATIENT
Start: 2024-06-04

## 2024-06-05 ENCOUNTER — OFFICE VISIT (OUTPATIENT)
Age: 54
End: 2024-06-05
Payer: COMMERCIAL

## 2024-06-05 ENCOUNTER — APPOINTMENT (OUTPATIENT)
Age: 54
End: 2024-06-05
Payer: COMMERCIAL

## 2024-06-05 VITALS
SYSTOLIC BLOOD PRESSURE: 152 MMHG | HEIGHT: 70 IN | TEMPERATURE: 98.6 F | OXYGEN SATURATION: 98 % | BODY MASS INDEX: 45.1 KG/M2 | HEART RATE: 103 BPM | WEIGHT: 315 LBS | DIASTOLIC BLOOD PRESSURE: 102 MMHG | RESPIRATION RATE: 18 BRPM

## 2024-06-05 DIAGNOSIS — E11.42 TYPE 2 DIABETES MELLITUS WITH DIABETIC POLYNEUROPATHY, WITHOUT LONG-TERM CURRENT USE OF INSULIN (HCC): Primary | ICD-10-CM

## 2024-06-05 DIAGNOSIS — M79.622 LEFT UPPER ARM PAIN: ICD-10-CM

## 2024-06-05 DIAGNOSIS — I10 PRIMARY HYPERTENSION: ICD-10-CM

## 2024-06-05 PROCEDURE — 73060 X-RAY EXAM OF HUMERUS: CPT

## 2024-06-05 PROCEDURE — 99214 OFFICE O/P EST MOD 30 MIN: CPT | Performed by: FAMILY MEDICINE

## 2024-06-05 RX ORDER — GLIPIZIDE 10 MG/1
10 TABLET, FILM COATED, EXTENDED RELEASE ORAL DAILY
Qty: 30 TABLET | Refills: 1 | Status: SHIPPED | OUTPATIENT
Start: 2024-06-05 | End: 2024-07-02

## 2024-06-06 ENCOUNTER — TELEPHONE (OUTPATIENT)
Age: 54
End: 2024-06-06

## 2024-06-06 ENCOUNTER — TELEPHONE (OUTPATIENT)
Dept: ADMINISTRATIVE | Facility: OTHER | Age: 54
End: 2024-06-06

## 2024-06-06 NOTE — TELEPHONE ENCOUNTER
Upon review of the In Basket request and the patient's chart, initial outreach has been made via fax to facility. Please see Contacts section for details.     Thank you  Calista Todd MA

## 2024-06-06 NOTE — LETTER
Diabetic Eye Exam Form    Date Requested: 24  Patient: Scott Delgado  Patient : 1970   Referring Provider: Francy Boykin DO      DIABETIC Eye Exam Date _______________________________      Type of Exam MUST be documented for Diabetic Eye Exams. Please CHECK ONE.     Retinal Exam       Dilated Retinal Exam       OCT       Optomap-Iris Exam      Fundus Photography       Left Eye - Please check Retinopathy or No Retinopathy        Exam did show retinopathy    Exam did not show retinopathy       Right Eye - Please check Retinopathy or No Retinopathy       Exam did show retinopathy    Exam did not show retinopathy       Comments __________________________________________________________    Practice Providing Exam ______________________________________________    Exam Performed By (print name) _______________________________________      Provider Signature ___________________________________________________      These reports are needed for  compliance.  Please fax this completed form and a copy of the Diabetic Eye Exam report to our office located at [address] [place] [address] as soon as possible via Fax [phone] attention Calista: Phone [phone]  We thank you for your assistance in treating our mutual patient.

## 2024-06-06 NOTE — TELEPHONE ENCOUNTER
----- Message from Shae SANTIAGO sent at 6/5/2024  2:34 PM EDT -----  Regarding: Diabetic Eye Exam  06/05/24 2:34 PM    Hello, our patient Scott Delgado has had Diabetic Eye Exam completed/performed. Please assist in updating the patient chart by making an External outreach to [facility] facility located in [place], [place]. The date of service is recently.    Thank you,  Shae Lang [facility] [place]

## 2024-06-07 NOTE — TELEPHONE ENCOUNTER
Upon review of the In Basket request we were able to locate, review, and update the patient chart as requested for Diabetic Eye Exam.    Any additional questions or concerns should be emailed to the Practice Liaisons via the appropriate education email address, please do not reply via In Basket.    Thank you  Calista Todd MA   [facility]      06/07/24 3:07 PM     Diabetic Eye Exam was/were not submitted to the patient's insurance.     Calista Todd MA

## 2024-06-10 ENCOUNTER — VBI (OUTPATIENT)
Dept: ADMINISTRATIVE | Facility: OTHER | Age: 54
End: 2024-06-10

## 2024-06-23 NOTE — PROGRESS NOTES
"Ambulatory Visit  Name: Scott Delgado      : 1970      MRN: [id number]  Encounter Provider: Francy Boykin DO  Encounter Date: 2024   Encounter department: [facility] PRIMARY CARE [place]    Assessment & Plan   1. Type 2 diabetes mellitus with diabetic polyneuropathy, without long-term current use of insulin (HCC)- not well controlled on metformin only. Unable to take Trulicity consistently due to supply shortage. Will replace with sulfonylurea.   -     glipiZIDE (GLUCOTROL XL) 10 mg 24 hr tablet; Take 1 tablet (10 mg total) by mouth daily  2. Left upper arm pain- tendonitis vs rotator cuff dysfunction. Will evaluate for bony pathology via imaging. Pt desires to establish with ortho. Pt encouraged to use heat, ice and otc analgesia for pain control.   -     XR humerus left; Future; Expected date: 2024  -     Ambulatory Referral to Orthopedic Surgery; Future  3. Primary hypertension- BP elevated. Didn't take medication today. Asymptomatic. Pt encouraged not to skip  doses of valsartan-hctz 160-25mg qd.        History of Present Illness     Pt presents for f/u  Discussed DM2 medicaito. Unable to use GLP agonist due to shortage.   Only using metformin and blood sugars have been >200  Didn't take his bp medicaton today    C/o left arm and shoulder pain. Onset x 1 week. Was lifting heavy while helping a relative move. Pain is  deep and achy  but isn't constant. Is daily. Doesn't radiate. Aggravated by most movement. No paraesthesia or weakness. Has history of rotator cuff tear in the left shoulder             Objective     BP (!) 152/102 (BP Location: Left arm, Patient Position: Sitting, Cuff Size: Standard)   Pulse 103   Temp 98.6 °F (37 °C) (Tympanic)   Resp 18   Ht 5' 10\" (1.778 m)   Wt (!) 144 kg (317 lb 12.8 oz)   SpO2 98%   BMI 45.60 kg/m²     Physical Exam  HENT:      Head: Normocephalic.   Eyes:      Conjunctiva/sclera: Conjunctivae normal.   Cardiovascular:      Rate " and Rhythm: Normal rate and regular rhythm.      Heart sounds: No murmur heard.  Pulmonary:      Effort: Pulmonary effort is normal.      Breath sounds: Normal breath sounds.   Musculoskeletal:      Left shoulder: Decreased range of motion (due to pain ; mostly over head, abduction.).   Neurological:      Mental Status: He is alert and oriented to person, place, and time.       Administrative Statements

## 2024-07-02 ENCOUNTER — PATIENT MESSAGE (OUTPATIENT)
Age: 54
End: 2024-07-02

## 2024-07-02 DIAGNOSIS — E11.42 TYPE 2 DIABETES MELLITUS WITH DIABETIC POLYNEUROPATHY, WITHOUT LONG-TERM CURRENT USE OF INSULIN (HCC): ICD-10-CM

## 2024-07-02 DIAGNOSIS — E11.42 TYPE 2 DIABETES MELLITUS WITH DIABETIC POLYNEUROPATHY, WITHOUT LONG-TERM CURRENT USE OF INSULIN (HCC): Primary | ICD-10-CM

## 2024-07-02 RX ORDER — GLIPIZIDE 10 MG/1
10 TABLET, FILM COATED, EXTENDED RELEASE ORAL DAILY
Qty: 90 TABLET | Refills: 1 | Status: SHIPPED | OUTPATIENT
Start: 2024-07-02

## 2024-08-07 ENCOUNTER — RA CDI HCC (OUTPATIENT)
Dept: OTHER | Facility: HOSPITAL | Age: 54
End: 2024-08-07

## 2024-08-14 ENCOUNTER — OFFICE VISIT (OUTPATIENT)
Age: 54
End: 2024-08-14
Payer: COMMERCIAL

## 2024-08-14 VITALS
OXYGEN SATURATION: 98 % | BODY MASS INDEX: 45.1 KG/M2 | DIASTOLIC BLOOD PRESSURE: 74 MMHG | HEART RATE: 96 BPM | SYSTOLIC BLOOD PRESSURE: 118 MMHG | HEIGHT: 70 IN | TEMPERATURE: 97.5 F | WEIGHT: 315 LBS

## 2024-08-14 DIAGNOSIS — E11.29 DIABETES MELLITUS WITH MICROALBUMINURIA  (HCC): Primary | ICD-10-CM

## 2024-08-14 DIAGNOSIS — K42.9 UMBILICAL HERNIA WITHOUT OBSTRUCTION AND WITHOUT GANGRENE: ICD-10-CM

## 2024-08-14 DIAGNOSIS — I10 PRIMARY HYPERTENSION: ICD-10-CM

## 2024-08-14 DIAGNOSIS — R80.9 DIABETES MELLITUS WITH MICROALBUMINURIA  (HCC): Primary | ICD-10-CM

## 2024-08-14 LAB — SL AMB POCT HEMOGLOBIN AIC: 7.6 (ref ?–6.5)

## 2024-08-14 PROCEDURE — 99214 OFFICE O/P EST MOD 30 MIN: CPT | Performed by: FAMILY MEDICINE

## 2024-08-14 PROCEDURE — 83036 HEMOGLOBIN GLYCOSYLATED A1C: CPT | Performed by: FAMILY MEDICINE

## 2024-08-14 NOTE — PROGRESS NOTES
"Visit  Name: Scott Delgado      : 1970      MRN: [id number]  Encounter Provider: Francy Boykin DO  Encounter Date: 2024   Encounter department: [facility] PRIMARY CARE [place]    Assessment & Plan   1. Diabetes mellitus with microalbuminuria  (HCC)-improving with A1c of 7.6.  Patient unable to tolerate Ozempic due to GI upset.  Will switch to oral.  Continue metformin and glipizide  -     POCT hemoglobin A1c  -     semaglutide (Rybelsus) 3 MG tablet; Take 1 tablet (3 mg total) by mouth daily before breakfast for 22 days  -     semaglutide (Rybelsus) 7 MG tablet; Take 1 tablet (7 mg total) by mouth daily before breakfast Do not start before 2024.  2. Primary hypertension-controlled on current antihypertensives  3. Umbilical hernia without obstruction and without gangrene-unchanged.       History of Present Illness     Patient presents for follow-up..  Complains of Ozempic side effects        Review of Systems   Constitutional:  Negative for fever.   Cardiovascular:  Negative for chest pain.   Gastrointestinal:  Positive for abdominal pain and nausea.       Objective     /74 (BP Location: Right arm, Patient Position: Sitting, Cuff Size: Large)   Pulse 96   Temp 97.5 °F (36.4 °C) (Tympanic)   Ht 5' 10\" (1.778 m)   Wt (!) 147 kg (323 lb 12.8 oz)   SpO2 98%   BMI 46.46 kg/m²     Physical Exam  HENT:      Head: Normocephalic.   Eyes:      Conjunctiva/sclera: Conjunctivae normal.   Cardiovascular:      Rate and Rhythm: Normal rate and regular rhythm.   Pulmonary:      Effort: Pulmonary effort is normal.      Breath sounds: Normal breath sounds.   Abdominal:      General: Bowel sounds are normal.      Palpations: Abdomen is soft.      Tenderness: There is no abdominal tenderness. There is no rebound.      Hernia: A hernia is present.   Neurological:      Mental Status: He is alert and oriented to person, place, and time.      Gait: Gait normal.   Psychiatric:       "   Mood and Affect: Mood normal.         Behavior: Behavior normal.       Administrative Statements

## 2024-08-15 DIAGNOSIS — E11.29 DIABETES MELLITUS WITH MICROALBUMINURIA  (HCC): ICD-10-CM

## 2024-08-15 DIAGNOSIS — R80.9 DIABETES MELLITUS WITH MICROALBUMINURIA  (HCC): ICD-10-CM

## 2024-08-16 ENCOUNTER — TELEPHONE (OUTPATIENT)
Age: 54
End: 2024-08-16

## 2024-08-19 RX ORDER — ORAL SEMAGLUTIDE 3 MG/1
TABLET ORAL
Qty: 30 TABLET | Refills: 0 | Status: SHIPPED | OUTPATIENT
Start: 2024-08-19

## 2024-09-30 DIAGNOSIS — R80.9 DIABETES MELLITUS WITH MICROALBUMINURIA  (HCC): ICD-10-CM

## 2024-09-30 DIAGNOSIS — E11.29 DIABETES MELLITUS WITH MICROALBUMINURIA  (HCC): ICD-10-CM

## 2025-01-07 DIAGNOSIS — E11.42 TYPE 2 DIABETES MELLITUS WITH DIABETIC POLYNEUROPATHY, WITHOUT LONG-TERM CURRENT USE OF INSULIN (HCC): ICD-10-CM

## 2025-01-07 RX ORDER — GLIPIZIDE 10 MG/1
10 TABLET, FILM COATED, EXTENDED RELEASE ORAL DAILY
Qty: 90 TABLET | Refills: 1 | Status: SHIPPED | OUTPATIENT
Start: 2025-01-07

## 2025-01-16 ENCOUNTER — RA CDI HCC (OUTPATIENT)
Dept: OTHER | Facility: HOSPITAL | Age: 55
End: 2025-01-16

## 2025-01-16 NOTE — PROGRESS NOTES
HCC coding opportunities          Chart Reviewed number of suggestions sent to Provider: 3   E66.01  E11.65  E11.3513    Patients Insurance        Commercial Insurance: [employer]

## 2025-01-23 ENCOUNTER — OFFICE VISIT (OUTPATIENT)
Age: 55
End: 2025-01-23
Payer: COMMERCIAL

## 2025-01-23 VITALS
WEIGHT: 315 LBS | DIASTOLIC BLOOD PRESSURE: 90 MMHG | HEART RATE: 100 BPM | SYSTOLIC BLOOD PRESSURE: 148 MMHG | HEIGHT: 70 IN | BODY MASS INDEX: 45.1 KG/M2 | OXYGEN SATURATION: 96 % | TEMPERATURE: 97.8 F | RESPIRATION RATE: 16 BRPM

## 2025-01-23 DIAGNOSIS — Z12.11 COLON CANCER SCREENING: ICD-10-CM

## 2025-01-23 DIAGNOSIS — E78.2 MIXED HYPERLIPIDEMIA: ICD-10-CM

## 2025-01-23 DIAGNOSIS — I10 PRIMARY HYPERTENSION: Primary | ICD-10-CM

## 2025-01-23 DIAGNOSIS — R80.9 DIABETES MELLITUS WITH MICROALBUMINURIA  (HCC): ICD-10-CM

## 2025-01-23 DIAGNOSIS — E11.29 DIABETES MELLITUS WITH MICROALBUMINURIA  (HCC): ICD-10-CM

## 2025-01-23 DIAGNOSIS — J06.9 VIRAL URI: ICD-10-CM

## 2025-01-23 PROCEDURE — 99214 OFFICE O/P EST MOD 30 MIN: CPT | Performed by: FAMILY MEDICINE

## 2025-01-23 RX ORDER — VALSARTAN 320 MG/1
320 TABLET ORAL DAILY
Qty: 30 TABLET | Refills: 5 | Status: SHIPPED | OUTPATIENT
Start: 2025-01-23

## 2025-01-23 RX ORDER — AMLODIPINE BESYLATE 5 MG/1
5 TABLET ORAL DAILY
Qty: 30 TABLET | Refills: 5 | Status: SHIPPED | OUTPATIENT
Start: 2025-01-23

## 2025-01-23 NOTE — PROGRESS NOTES
"Name: Scott Delgado      : 1970      MRN: [id number]  Encounter Provider: Francy Boykin DO  Encounter Date: 2025   Encounter department: [facility] PRIMARY CARE [place]  :  Assessment & Plan  Primary hypertension  BP not at goal.  Will increase valsartan.  DC hydrochlorothiazide.  Add amlodipine.  Patient will keep amatory BP for continued monitoring  Orders:    amLODIPine (NORVASC) 5 mg tablet; Take 1 tablet (5 mg total) by mouth daily    valsartan (DIOVAN) 320 MG tablet; Take 1 tablet (320 mg total) by mouth daily    Diabetes mellitus with microalbuminuria  (HCC)  Unalbel to tolerate the rybelsus due to gastric upset. Will go back to [facility]. Continue glipizide 10mg qd and metformin 1000mg bid   Lab Results   Component Value Date    HGBA1C 7.6 (A) 2024       Orders:    Dulaglutide 1.5 MG/0.5ML SOAJ; Inject 1.5 mg under the skin once a week    Albumin / creatinine urine ratio; Future    Comprehensive metabolic panel; Future    Hemoglobin A1C; Future    Mixed hyperlipidemia  On statin  Orders:    Lipid Panel with Direct LDL reflex; Future    Colon cancer screening    Orders:    Ambulatory Referral to Gastroenterology; Future    Viral URI  Resolving.  Very mild wheeze noted on exam.  Patient denies breathlessness or chest tightness.  Encouraged use of albuterol when needed              History of Present Illness   Patient presents for follow-up.  Discussed hypertension and diabetes.  Discussed weight loss.  Discussed colon cancer screening          Objective   /90   Pulse 100   Temp 97.8 °F (36.6 °C) (Tympanic)   Resp 16   Ht 5' 10\" (1.778 m)   Wt (!) 157 kg (345 lb 9.6 oz)   SpO2 96%   BMI 49.59 kg/m²      Physical Exam  HENT:      Head: Normocephalic.      Nose: Congestion present.   Eyes:      Conjunctiva/sclera: Conjunctivae normal.      Pupils: Pupils are equal, round, and reactive to light.   Cardiovascular:      Rate and Rhythm: Normal rate and regular " rhythm.   Pulmonary:      Effort: Pulmonary effort is normal.      Breath sounds: Wheezing (Rare scattered throughout bilateral bases) present.   Abdominal:      General: Bowel sounds are normal.      Palpations: Abdomen is soft.   Musculoskeletal:      Right lower leg: No edema.      Left lower leg: No edema.   Neurological:      Mental Status: He is alert and oriented to person, place, and time.      Gait: Gait normal.   Psychiatric:         Mood and Affect: Mood normal.         Behavior: Behavior normal.

## 2025-01-23 NOTE — ASSESSMENT & PLAN NOTE
BP not at goal.  Will increase valsartan.  DC hydrochlorothiazide.  Add amlodipine.  Patient will keep amatory BP for continued monitoring  Orders:    amLODIPine (NORVASC) 5 mg tablet; Take 1 tablet (5 mg total) by mouth daily    valsartan (DIOVAN) 320 MG tablet; Take 1 tablet (320 mg total) by mouth daily

## 2025-01-23 NOTE — ASSESSMENT & PLAN NOTE
Unalbel to tolerate the rybelsus due to gastric upset. Will go back to [facility]. Continue glipizide 10mg qd and metformin 1000mg bid   Lab Results   Component Value Date    HGBA1C 7.6 (A) 08/14/2024       Orders:    Dulaglutide 1.5 MG/0.5ML SOAJ; Inject 1.5 mg under the skin once a week    Albumin / creatinine urine ratio; Future    Comprehensive metabolic panel; Future    Hemoglobin A1C; Future

## 2025-01-27 ENCOUNTER — PATIENT MESSAGE (OUTPATIENT)
Age: 55
End: 2025-01-27

## 2025-01-27 DIAGNOSIS — J06.9 VIRAL URI: Primary | ICD-10-CM

## 2025-01-27 NOTE — PATIENT COMMUNICATION
Pt called in asking about his message sent on My chart about his congestion. He states he just needs something to help break up the congestion in his nose. States he is blowing out yellow nasal discharge. If able he would please like something called to his [facility] pharmacy.

## 2025-01-28 RX ORDER — DEXTROMETHORPHAN HYDROBROMIDE AND PROMETHAZINE HYDROCHLORIDE 15; 6.25 MG/5ML; MG/5ML
5 SYRUP ORAL 4 TIMES DAILY PRN
Qty: 240 ML | Refills: 0 | Status: SHIPPED | OUTPATIENT
Start: 2025-01-28

## 2025-02-15 ENCOUNTER — PATIENT MESSAGE (OUTPATIENT)
Age: 55
End: 2025-02-15

## 2025-02-15 DIAGNOSIS — I10 PRIMARY HYPERTENSION: ICD-10-CM

## 2025-02-15 RX ORDER — AMLODIPINE BESYLATE 5 MG/1
5 TABLET ORAL DAILY
Qty: 30 TABLET | Refills: 0 | Status: SHIPPED | OUTPATIENT
Start: 2025-02-15 | End: 2025-02-17 | Stop reason: SDUPTHER

## 2025-02-17 DIAGNOSIS — I10 PRIMARY HYPERTENSION: ICD-10-CM

## 2025-02-17 RX ORDER — AMLODIPINE BESYLATE 10 MG/1
10 TABLET ORAL DAILY
Qty: 90 TABLET | Refills: 1 | Status: SHIPPED | OUTPATIENT
Start: 2025-02-17

## 2025-02-18 RX ORDER — VALSARTAN 320 MG/1
320 TABLET ORAL DAILY
Qty: 90 TABLET | Refills: 1 | Status: SHIPPED | OUTPATIENT
Start: 2025-02-18

## 2025-02-19 ENCOUNTER — PATIENT MESSAGE (OUTPATIENT)
Age: 55
End: 2025-02-19

## 2025-02-19 DIAGNOSIS — E11.29 DIABETES MELLITUS WITH MICROALBUMINURIA  (HCC): ICD-10-CM

## 2025-02-19 DIAGNOSIS — R80.9 DIABETES MELLITUS WITH MICROALBUMINURIA  (HCC): ICD-10-CM

## 2025-02-19 RX ORDER — DULAGLUTIDE 3 MG/.5ML
3 INJECTION, SOLUTION SUBCUTANEOUS WEEKLY
Qty: 4 ML | Refills: 0 | Status: SHIPPED | OUTPATIENT
Start: 2025-02-19 | End: 2025-03-19

## 2025-02-28 ENCOUNTER — PREP FOR PROCEDURE (OUTPATIENT)
Age: 55
End: 2025-02-28

## 2025-02-28 ENCOUNTER — TELEPHONE (OUTPATIENT)
Age: 55
End: 2025-02-28

## 2025-02-28 DIAGNOSIS — Z86.0100 HISTORY OF COLON POLYPS: Primary | ICD-10-CM

## 2025-02-28 NOTE — LETTER
Hello,    Attached are your prep instructions for your upcoming procedure. Please feel free to contact us at [phone] if you have any questions.    Thank you,     [facility] Gastroenterology, Colon & Rectal Surgery

## 2025-02-28 NOTE — TELEPHONE ENCOUNTER
Scheduled date of colonoscopy (as of today):3/17/25  Physician performing colonoscopy:Dr Jose   Location of colonoscopy:[place]  Bowel prep reviewed with patient:sent doreen/dul pep to patient's mychart   Instructions reviewed with patient by:cass  Clearances: diabetic, trulicity hold 7 days prior.

## 2025-02-28 NOTE — TELEPHONE ENCOUNTER
02/28/25  Screened by: Lachelle Gasca    Referring Provider vilma    Pre- Screening:     There is no height or weight on file to calculate BMI.49.59  Has patient been referred for a routine screening Colonoscopy? yes  Is the patient between 45-75 years old? yes      Previous Colonoscopy yes   If yes:    Date:     Facility:     Reason:           Does the patient want to see a Gastroenterologist prior to their procedure OR are they having any GI symptoms? no    Has the patient been hospitalized or had abdominal surgery in the past 6 months? no    Does the patient use supplemental oxygen? no    Does the patient take Coumadin, Lovenox, Plavix, Elliquis, Xarelto, or other blood thinning medication? no    Has the patient had a stroke, cardiac event, or stent placed in the past year? no      If patient is between 45yrs - 49yrs, please advise patient that we will have to confirm benefits & coverage with their insurance company for a routine screening colonoscopy.

## 2025-03-03 ENCOUNTER — OFFICE VISIT (OUTPATIENT)
Age: 55
End: 2025-03-03
Payer: COMMERCIAL

## 2025-03-03 VITALS
TEMPERATURE: 98.4 F | OXYGEN SATURATION: 97 % | HEART RATE: 89 BPM | SYSTOLIC BLOOD PRESSURE: 178 MMHG | HEIGHT: 70 IN | DIASTOLIC BLOOD PRESSURE: 86 MMHG | BODY MASS INDEX: 45.1 KG/M2 | RESPIRATION RATE: 18 BRPM | WEIGHT: 315 LBS

## 2025-03-03 DIAGNOSIS — I10 PRIMARY HYPERTENSION: ICD-10-CM

## 2025-03-03 DIAGNOSIS — R80.9 DIABETES MELLITUS WITH MICROALBUMINURIA  (HCC): ICD-10-CM

## 2025-03-03 DIAGNOSIS — E78.2 MIXED HYPERLIPIDEMIA: ICD-10-CM

## 2025-03-03 DIAGNOSIS — E11.29 DIABETES MELLITUS WITH MICROALBUMINURIA  (HCC): ICD-10-CM

## 2025-03-03 DIAGNOSIS — Z12.5 SCREENING FOR PROSTATE CANCER: ICD-10-CM

## 2025-03-03 DIAGNOSIS — Z00.00 ANNUAL PHYSICAL EXAM: Primary | ICD-10-CM

## 2025-03-03 LAB — SL AMB POCT HEMOGLOBIN AIC: 9.5 (ref ?–6.5)

## 2025-03-03 PROCEDURE — 99396 PREV VISIT EST AGE 40-64: CPT | Performed by: FAMILY MEDICINE

## 2025-03-03 PROCEDURE — 99214 OFFICE O/P EST MOD 30 MIN: CPT | Performed by: FAMILY MEDICINE

## 2025-03-03 PROCEDURE — 83036 HEMOGLOBIN GLYCOSYLATED A1C: CPT | Performed by: FAMILY MEDICINE

## 2025-03-03 RX ORDER — DULAGLUTIDE 4.5 MG/.5ML
1 INJECTION, SOLUTION SUBCUTANEOUS WEEKLY
Qty: 4 ML | Refills: 5 | Status: SHIPPED | OUTPATIENT
Start: 2025-03-03 | End: 2025-03-05 | Stop reason: SDUPTHER

## 2025-03-03 RX ORDER — VALSARTAN AND HYDROCHLOROTHIAZIDE 320; 25 MG/1; MG/1
1 TABLET, FILM COATED ORAL DAILY
Qty: 30 TABLET | Refills: 5 | Status: SHIPPED | OUTPATIENT
Start: 2025-03-03 | End: 2025-03-28

## 2025-03-03 NOTE — PATIENT INSTRUCTIONS
"Patient Education     Routine physical for adults   The Basics   Written by the doctors and editors at [facility]   What is a physical? -- A physical is a routine visit, or \"check-up,\" with your doctor. You might also hear it called a \"wellness visit\" or \"preventive visit.\"  During each visit, the doctor will:   Ask about your physical and mental health   Ask about your habits, behaviors, and lifestyle   Do an exam   Give you vaccines if needed   Talk to you about any medicines you take   Give advice about your health   Answer your questions  Getting regular check-ups is an important part of taking care of your health. It can help your doctor find and treat any problems you have. But it's also important for preventing health problems.  A routine physical is different from a \"sick visit.\" A sick visit is when you see a doctor because of a health concern or problem. Since physicals are scheduled ahead of time, you can think about what you want to ask the doctor.  How often should I get a physical? -- It depends on your age and health. In general, for people age 21 years and older:   If you are younger than 50 years, you might be able to get a physical every 3 years.   If you are 50 years or older, your doctor might recommend a physical every year.  If you have an ongoing health condition, like diabetes or high blood pressure, your doctor will probably want to see you more often.  What happens during a physical? -- In general, each visit will include:   Physical exam - The doctor or nurse will check your height, weight, heart rate, and blood pressure. They will also look at your eyes and ears. They will ask about how you are feeling and whether you have any symptoms that bother you.   Medicines - It's a good idea to bring a list of all the medicines you take to each doctor visit. Your doctor will talk to you about your medicines and answer any questions. Tell them if you are having any side effects that bother you. You " "should also tell them if you are having trouble paying for any of your medicines.   Habits and behaviors - This includes:   Your diet   Your exercise habits   Whether you smoke, drink alcohol, or use drugs   Whether you are sexually active   Whether you feel safe at home  Your doctor will talk to you about things you can do to improve your health and lower your risk of health problems. They will also offer help and support. For example, if you want to quit smoking, they can give you advice and might prescribe medicines. If you want to improve your diet or get more physical activity, they can help you with this, too.   Lab tests, if needed - The tests you get will depend on your age and situation. For example, your doctor might want to check your:   Cholesterol   Blood sugar   Iron level   Vaccines - The recommended vaccines will depend on your age, health, and what vaccines you already had. Vaccines are very important because they can prevent certain serious or deadly infections.   Discussion of screening - \"Screening\" means checking for diseases or other health problems before they cause symptoms. Your doctor can recommend screening based on your age, risk, and preferences. This might include tests to check for:   Cancer, such as breast, prostate, cervical, ovarian, colorectal, prostate, lung, or skin cancer   Sexually transmitted infections, such as chlamydia and gonorrhea   Mental health conditions like depression and anxiety  Your doctor will talk to you about the different types of screening tests. They can help you decide which screenings to have. They can also explain what the results might mean.   Answering questions - The physical is a good time to ask the doctor or nurse questions about your health. If needed, they can refer you to other doctors or specialists, too.  Adults older than 65 years often need other care, too. As you get older, your doctor will talk to you about:   How to prevent falling at " home   Hearing or vision tests   Memory testing   How to take your medicines safely   Making sure that you have the help and support you need at home  All topics are updated as new evidence becomes available and our peer review process is complete.  This topic retrieved from [employer] on: May 02, 2024.  Topic 317392 Version 1.0  Release: 32.4.3 - C32.122  © 2024 UpToDate, Inc. and/or its affiliates. All rights reserved.  Consumer Information Use and Disclaimer   Disclaimer: This generalized information is a limited summary of diagnosis, treatment, and/or medication information. It is not meant to be comprehensive and should be used as a tool to help the user understand and/or assess potential diagnostic and treatment options. It does NOT include all information about conditions, treatments, medications, side effects, or risks that may apply to a specific patient. It is not intended to be medical advice or a substitute for the medical advice, diagnosis, or treatment of a health care provider based on the health care provider's examination and assessment of a patient's specific and unique circumstances. Patients must speak with a health care provider for complete information about their health, medical questions, and treatment options, including any risks or benefits regarding use of medications. This information does not endorse any treatments or medications as safe, effective, or approved for treating a specific patient. UpToDate, Inc. and its affiliates disclaim any warranty or liability relating to this information or the use thereof.The use of this information is governed by the Terms of Use, available at https://www.woltersApoVaxuwer.com/en/know/clinical-effectiveness-terms. 2024© UpToDate, Inc. and its affiliates and/or licensors. All rights reserved.  Copyright   © 2024 UpToDate, Inc. and/or its affiliates. All rights reserved.

## 2025-03-03 NOTE — ASSESSMENT & PLAN NOTE
Worsening.  Previous A1c was 7.6.  Patient admits to inconsistency with diet and medication use.  Patient counseled on the importance of medication compliance.  Will increase dose of Trulicity and start Jardiance therapy  Lab Results   Component Value Date    HGBA1C 9.5 (A) 03/03/2025     Orders:    POCT hemoglobin A1c    Empagliflozin (JARDIANCE) 10 MG TABS tablet; Take 1 tablet (10 mg total) by mouth daily    Dulaglutide (Trulicity) 4.5 MG/0.5ML SOAJ; Inject 1 Application under the skin once a week

## 2025-03-03 NOTE — PROGRESS NOTES
Adult Annual Physical  Name: Scott Delgado      : 1970      MRN: [id number]  Encounter Provider: Francy Boykin DO  Encounter Date: 3/3/2025   Encounter department: [facility] PRIMARY CARE [place]    Assessment & Plan  Annual physical exam         Diabetes mellitus with microalbuminuria  (HCC)  Worsening.  Previous A1c was 7.6.  Patient admits to inconsistency with diet and medication use.  Patient counseled on the importance of medication compliance.  Will increase dose of Trulicity and start Jardiance therapy  Lab Results   Component Value Date    HGBA1C 9.5 (A) 2025     Orders:    POCT hemoglobin A1c    Empagliflozin (JARDIANCE) 10 MG TABS tablet; Take 1 tablet (10 mg total) by mouth daily    Dulaglutide (Trulicity) 4.5 MG/0.5ML SOAJ; Inject 1 Application under the skin once a week    Primary hypertension  Not well-controlled.  Will increase Diovan dosing.  Continue amlodipine 10 mg daily  Orders:    valsartan-hydrochlorothiazide (DIOVAN-HCT) 320-25 MG per tablet; Take 1 tablet by mouth daily    Mixed hyperlipidemia  Poorly controlled via diet.  Patient encouraged to consider statin         Immunizations and preventive care screenings were discussed with patient today. Appropriate education was printed on patient's after visit summary.    Discussed risks and benefits of prostate cancer screening. We discussed the controversial history of PSA screening for prostate cancer in the [place] as well as the risk of over detection and over treatment of prostate cancer by way of PSA screening.  The patient understands that PSA blood testing is an imperfect way to screen for prostate cancer and that elevated PSA levels in the blood may also be caused by infection, inflammation, prostatic trauma or manipulation, urological procedures, or by benign prostatic enlargement.    The role of the digital rectal examination in prostate cancer screening was also discussed and I discussed with  "him that there is large interobserver variability in the findings of digital rectal examination.    Counseling:  Exercise: the importance of regular exercise/physical activity was discussed. Recommend exercise 3-5 times per week for at least 30 minutes.          History of Present Illness     Adult Annual Physical:  Patient presents for annual physical.     Diet and Physical Activity:  - Diet/Nutrition: diabetic diet.  - Exercise: no formal exercise.    Depression Screening:  - PHQ-2 Score: 0    General Health:  - Sleep: 4-6 hours of sleep on average.  - Hearing: normal hearing bilateral ears.  - Vision: goes for regular eye exams.  - Dental: regular dental visits.    Review of Systems   Constitutional:  Negative for fever.   Respiratory:  Negative for shortness of breath.    Cardiovascular:  Negative for chest pain and leg swelling.   Gastrointestinal:  Positive for nausea. Negative for constipation.         Objective   BP (!) 178/86 (BP Location: Left arm, Patient Position: Sitting, Cuff Size: Standard)   Pulse 89   Temp 98.4 °F (36.9 °C) (Tympanic)   Resp 18   Ht 5' 10\" (1.778 m)   Wt (!) 162 kg (357 lb 9.6 oz)   SpO2 97%   BMI 51.31 kg/m²     Physical Exam  HENT:      Head: Normocephalic and atraumatic.      Right Ear: External ear normal.      Left Ear: External ear normal.   Eyes:      Conjunctiva/sclera: Conjunctivae normal.      Pupils: Pupils are equal, round, and reactive to light.   Cardiovascular:      Rate and Rhythm: Normal rate and regular rhythm.      Heart sounds: No murmur heard.  Pulmonary:      Effort: Pulmonary effort is normal.      Breath sounds: Normal breath sounds.   Abdominal:      General: Bowel sounds are normal.      Palpations: Abdomen is soft.   Musculoskeletal:      Right lower leg: No edema.      Left lower leg: No edema.   Neurological:      Mental Status: He is alert and oriented to person, place, and time.      Gait: Gait normal.   Psychiatric:         Mood and Affect: " Mood normal.         Behavior: Behavior normal.

## 2025-03-03 NOTE — ASSESSMENT & PLAN NOTE
Not well-controlled.  Will increase Diovan dosing.  Continue amlodipine 10 mg daily  Orders:    valsartan-hydrochlorothiazide (DIOVAN-HCT) 320-25 MG per tablet; Take 1 tablet by mouth daily

## 2025-03-03 NOTE — PROGRESS NOTES
Diabetic Foot Exam    Patient's shoes and socks removed.    Right Foot/Ankle   Right Foot Inspection  Skin Exam: skin normal, skin intact, dry skin, callus and callus. No warmth, no erythema, no maceration, no abnormal color, no pre-ulcer and no ulcer.     Toe Exam: ROM and strength within normal limits.     Sensory   Monofilament testing: intact    Vascular  Capillary refills: < 3 seconds  The right DP pulse is 2+.     Left Foot/Ankle  Left Foot Inspection  Skin Exam: skin normal, skin intact, dry skin and callus. No warmth, no erythema, no maceration, normal color, no pre-ulcer and no ulcer.     Toe Exam: ROM and strength within normal limits.     Sensory   Monofilament testing: intact    Vascular  Capillary refills: < 3 seconds  The left DP pulse is 2+.     Assign Risk Category  No deformity present  Loss of protective sensation  No weak pulses  Risk: 1

## 2025-03-04 ENCOUNTER — PATIENT MESSAGE (OUTPATIENT)
Age: 55
End: 2025-03-04

## 2025-03-04 ENCOUNTER — TELEPHONE (OUTPATIENT)
Age: 55
End: 2025-03-04

## 2025-03-04 DIAGNOSIS — R80.9 DIABETES MELLITUS WITH MICROALBUMINURIA  (HCC): ICD-10-CM

## 2025-03-04 DIAGNOSIS — E11.29 DIABETES MELLITUS WITH MICROALBUMINURIA  (HCC): ICD-10-CM

## 2025-03-04 RX ORDER — DULAGLUTIDE 4.5 MG/.5ML
INJECTION, SOLUTION SUBCUTANEOUS
Refills: 5 | OUTPATIENT
Start: 2025-03-04

## 2025-03-04 NOTE — TELEPHONE ENCOUNTER
Reason for call:   [x] Prior Auth  [] Other:     Caller:  [] Patient  [x] Pharmacy  Name: [id number]  Address: [address] [place] [address]  Callback Number: [phone]    Medication: Dulaglutide (Trulicity) 4.5 MG/0.5ML     Dose/Frequency: Inject 1 Application under the skin once a week     Quantity: 4ml    Ordering Provider:   [] PCP/Provider -   [] Speciality/Provider -     Has the patient tried other medications and failed? If failed,

## 2025-03-05 RX ORDER — DULAGLUTIDE 4.5 MG/.5ML
1 INJECTION, SOLUTION SUBCUTANEOUS WEEKLY
Qty: 4 ML | Refills: 5 | Status: SHIPPED | OUTPATIENT
Start: 2025-03-05

## 2025-03-05 NOTE — TELEPHONE ENCOUNTER
PA for Dulaglutide (Trulicity) 4.5 MG/0.5ML SOAJ SUBMITTED to     via    [x]CMM-KEY: [id number]  []Surescripts-Case ID #   []Availity-Auth ID # NDC #   []Faxed to plan   []Other website   []Phone call Case ID #     [x]PA sent as URGENT    All office notes, labs and other pertaining documents and studies sent. Clinical questions answered. Awaiting determination from insurance company.     Turnaround time for your insurance to make a decision on your Prior Authorization can take 7-21 business days.

## 2025-03-05 NOTE — TELEPHONE ENCOUNTER
PA for Dulaglutide (Trulicity) 4.5 MG/0.5ML SOAJ  APPROVED     Date(s) approved 01/05/2025 to 03/04/2026      Patient advised by          []MyChart Message  []Phone call   []LMOM  []L/M to call office as no active Communication consent on file  []Unable to leave detailed message as VM not approved on Communication consent       Pharmacy advised by    [x]Fax  []Phone call  []Secure Chat       Approval letter scanned into Media Yes

## 2025-03-12 ENCOUNTER — PREP FOR PROCEDURE (OUTPATIENT)
Age: 55
End: 2025-03-12

## 2025-03-17 ENCOUNTER — ANESTHESIA EVENT (OUTPATIENT)
Dept: GASTROENTEROLOGY | Facility: HOSPITAL | Age: 55
End: 2025-03-17
Payer: COMMERCIAL

## 2025-03-17 ENCOUNTER — HOSPITAL ENCOUNTER (OUTPATIENT)
Dept: GASTROENTEROLOGY | Facility: HOSPITAL | Age: 55
Setting detail: OUTPATIENT SURGERY
Discharge: HOME | End: 2025-03-17
Attending: INTERNAL MEDICINE
Payer: COMMERCIAL

## 2025-03-17 ENCOUNTER — ANESTHESIA (OUTPATIENT)
Dept: GASTROENTEROLOGY | Facility: HOSPITAL | Age: 55
End: 2025-03-17
Payer: COMMERCIAL

## 2025-03-17 VITALS
TEMPERATURE: 97.9 F | RESPIRATION RATE: 16 BRPM | OXYGEN SATURATION: 98 % | HEART RATE: 90 BPM | DIASTOLIC BLOOD PRESSURE: 74 MMHG | WEIGHT: 315 LBS | SYSTOLIC BLOOD PRESSURE: 124 MMHG | BODY MASS INDEX: 45.1 KG/M2 | HEIGHT: 70 IN

## 2025-03-17 DIAGNOSIS — Z86.0100 HISTORY OF COLON POLYPS: ICD-10-CM

## 2025-03-17 LAB — GLUCOSE SERPL-MCNC: 239 MG/DL (ref 65–140)

## 2025-03-17 PROCEDURE — 82948 REAGENT STRIP/BLOOD GLUCOSE: CPT

## 2025-03-17 RX ORDER — PROPOFOL 10 MG/ML
INJECTION, EMULSION INTRAVENOUS AS NEEDED
Status: DISCONTINUED | OUTPATIENT
Start: 2025-03-17 | End: 2025-03-17

## 2025-03-17 RX ORDER — LIDOCAINE HYDROCHLORIDE 10 MG/ML
INJECTION, SOLUTION EPIDURAL; INFILTRATION; INTRACAUDAL; PERINEURAL AS NEEDED
Status: DISCONTINUED | OUTPATIENT
Start: 2025-03-17 | End: 2025-03-17

## 2025-03-17 RX ORDER — SODIUM CHLORIDE, SODIUM LACTATE, POTASSIUM CHLORIDE, CALCIUM CHLORIDE 600; 310; 30; 20 MG/100ML; MG/100ML; MG/100ML; MG/100ML
INJECTION, SOLUTION INTRAVENOUS CONTINUOUS PRN
Status: DISCONTINUED | OUTPATIENT
Start: 2025-03-17 | End: 2025-03-17

## 2025-03-17 RX ADMIN — SODIUM CHLORIDE, SODIUM LACTATE, POTASSIUM CHLORIDE, AND CALCIUM CHLORIDE: .6; .31; .03; .02 INJECTION, SOLUTION INTRAVENOUS at 07:12

## 2025-03-17 RX ADMIN — LIDOCAINE HYDROCHLORIDE 50 MG: 10 INJECTION, SOLUTION EPIDURAL; INFILTRATION; INTRACAUDAL at 07:16

## 2025-03-17 RX ADMIN — PROPOFOL 80 MG: 10 INJECTION, EMULSION INTRAVENOUS at 07:18

## 2025-03-17 RX ADMIN — PROPOFOL 100 MG: 10 INJECTION, EMULSION INTRAVENOUS at 07:17

## 2025-03-17 NOTE — ANESTHESIA POSTPROCEDURE EVALUATION
Post-Op Assessment Note    CV Status:  Stable  Pain Score: 0    Pain management: adequate       Mental Status:  Sleepy and arousable   Hydration Status:  Euvolemic   PONV Controlled:  Controlled   Airway Patency:  Patent     Post Op Vitals Reviewed: Yes    No anethesia notable event occurred.    Staff: CRNA           Last Filed PACU Vitals:  Vitals Value Taken Time   Temp     Pulse     BP     Resp     SpO2         Modified Anna:     Vitals Value Taken Time   Activity 2 03/17/25 0723   Respiration 2 03/17/25 0723   Circulation 2 03/17/25 0723   Consciousness 1 03/17/25 0723   Oxygen Saturation 2 03/17/25 0723     Modified Anna Score: 9

## 2025-03-17 NOTE — H&P
"History and Physical -  [facility]  Scott Delgado 54 y.o. male MRN: [id number]                  HPI: Scott Delgado is a 54 y.o. year old male who presents for colonoscopy for history of colon polyps with piecemeal removal.  Last colonoscopy 2 years ago       REVIEW OF SYSTEMS: Per the HPI, and otherwise unremarkable.    Historical Information   Past Medical History:   Diagnosis Date    Diabetes mellitus (HCC)     Hyperlipidemia     Hypertension     Obesity 1/1/2000     Past Surgical History:   Procedure Laterality Date    KNEE SURGERY Left 1995    SURGERY SCROTAL / TESTICULAR Right 2011    maybe 10 years ago     Social History   Social History     Substance and Sexual Activity   Alcohol Use Yes    Alcohol/week: 2.0 standard drinks of alcohol    Types: 2 Cans of beer per week    Comment: social maybe a 6 pack in one month     Social History     Substance and Sexual Activity   Drug Use Not Currently    Types: Marijuana     Social History     Tobacco Use   Smoking Status Never   Smokeless Tobacco Never     Family History   Problem Relation Age of Onset    Diabetes Mother     Diabetes Father        Meds/Allergies     Not in a hospital admission.    No Known Allergies    Objective     Blood pressure 170/82, pulse 94, temperature 97.6 °F (36.4 °C), temperature source Temporal, resp. rate 17, height 5' 10\" (1.778 m), weight (!) 162 kg (356 lb 4.2 oz), SpO2 97%.      PHYSICAL EXAM    Gen: NAD  CV: RRR  CHEST: Clear  ABD: soft, NT/ND  EXT: no edema  Neuro: AAO      ASSESSMENT/PLAN:  This is a 54 y.o. year old male here for history of polyps with piecemeal removal    PLAN:   Procedure: Colonoscopy          "

## 2025-03-17 NOTE — ANESTHESIA PREPROCEDURE EVALUATION
Procedure:  COLONOSCOPY    Relevant Problems   CARDIO   (+) Hyperlipidemia   (+) Primary hypertension      Endocrine   (+) Diabetes mellitus with microalbuminuria  (HCC)      Other   (+) Morbid obesity with BMI of 40.0-44.9, adult (HCC)      Diabetes mellitus (HCC)    Hypertension    Hyperlipidemia    Obesity    Colon polyp      Physical Exam    Airway    Mallampati score: II  TM Distance: >3 FB  Neck ROM: full     Dental       Cardiovascular  Cardiovascular exam normal    Pulmonary  Pulmonary exam normal     Other Findings        Anesthesia Plan  ASA Score- 3     Anesthesia Type- IV sedation with anesthesia with ASA Monitors.         Additional Monitors:     Airway Plan:            Plan Factors-Exercise tolerance (METS): >4 METS.    Chart reviewed. EKG reviewed. Imaging results reviewed. Existing labs reviewed. Patient summary reviewed.                  Induction- intravenous.    Postoperative Plan-         Informed Consent- Anesthetic plan and risks discussed with patient.  I personally reviewed this patient with the CRNA. Discussed and agreed on the Anesthesia Plan with the CRNA..      NPO Status:  Vitals Value Taken Time   Date of last liquid 03/16/25 03/17/25 0642   Time of last liquid 2300 03/17/25 0642   Date of last solid 03/15/25 03/17/25 0642   Time of last solid

## 2025-03-19 ENCOUNTER — PATIENT MESSAGE (OUTPATIENT)
Age: 55
End: 2025-03-19

## 2025-03-19 DIAGNOSIS — K31.84 GASTROPARESIS: Primary | ICD-10-CM

## 2025-03-19 RX ORDER — METOCLOPRAMIDE 5 MG/1
5 TABLET ORAL EVERY 8 HOURS PRN
Qty: 21 TABLET | Refills: 0 | Status: SHIPPED | OUTPATIENT
Start: 2025-03-19

## 2025-03-26 DIAGNOSIS — I10 PRIMARY HYPERTENSION: ICD-10-CM

## 2025-03-28 RX ORDER — VALSARTAN AND HYDROCHLOROTHIAZIDE 320; 25 MG/1; MG/1
1 TABLET, FILM COATED ORAL DAILY
Qty: 90 TABLET | Refills: 2 | Status: SHIPPED | OUTPATIENT
Start: 2025-03-28

## 2025-04-17 ENCOUNTER — TELEPHONE (OUTPATIENT)
Age: 55
End: 2025-04-17

## 2025-04-18 DIAGNOSIS — K31.84 GASTROPARESIS: ICD-10-CM

## 2025-04-18 RX ORDER — METOCLOPRAMIDE 5 MG/1
5 TABLET ORAL EVERY 8 HOURS PRN
Qty: 21 TABLET | Refills: 0 | Status: SHIPPED | OUTPATIENT
Start: 2025-04-18

## 2025-06-03 ENCOUNTER — VBI (OUTPATIENT)
Dept: ADMINISTRATIVE | Facility: OTHER | Age: 55
End: 2025-06-03

## 2025-06-03 NOTE — TELEPHONE ENCOUNTER
06/03/25 11:34 AM    Patient was called to schedule a diabetic follow up apointment ; a message was left for the patient to return the call.    Thank you.  Estrellita Rangel MA  [facility]

## 2025-08-06 DIAGNOSIS — I10 PRIMARY HYPERTENSION: ICD-10-CM

## 2025-08-08 RX ORDER — AMLODIPINE BESYLATE 10 MG/1
10 TABLET ORAL DAILY
Qty: 90 TABLET | Refills: 1 | Status: SHIPPED | OUTPATIENT
Start: 2025-08-08